# Patient Record
Sex: FEMALE | Race: WHITE | URBAN - METROPOLITAN AREA
[De-identification: names, ages, dates, MRNs, and addresses within clinical notes are randomized per-mention and may not be internally consistent; named-entity substitution may affect disease eponyms.]

---

## 2020-06-29 ENCOUNTER — LAB OUTSIDE AN ENCOUNTER (OUTPATIENT)
Dept: URBAN - METROPOLITAN AREA CLINIC 96 | Facility: CLINIC | Age: 65
End: 2020-06-29

## 2020-07-02 ENCOUNTER — OFFICE VISIT (OUTPATIENT)
Dept: URBAN - METROPOLITAN AREA CLINIC 53 | Facility: CLINIC | Age: 65
End: 2020-07-02

## 2020-07-02 ENCOUNTER — OFFICE VISIT (OUTPATIENT)
Dept: URBAN - METROPOLITAN AREA CLINIC 53 | Facility: CLINIC | Age: 65
End: 2020-07-02
Payer: MEDICARE

## 2020-07-02 DIAGNOSIS — K51.80 CHRONIC PANCOLONIC ULCERATIVE COLITIS: ICD-10-CM

## 2020-07-02 LAB
PERFORMING LAB: (no result)
SOURCE: (no result)

## 2020-07-02 PROCEDURE — 96413 CHEMO IV INFUSION 1 HR: CPT | Performed by: INTERNAL MEDICINE

## 2020-07-07 ENCOUNTER — OFFICE VISIT (OUTPATIENT)
Dept: URBAN - METROPOLITAN AREA CLINIC 96 | Facility: CLINIC | Age: 65
End: 2020-07-07

## 2020-07-08 ENCOUNTER — LAB OUTSIDE AN ENCOUNTER (OUTPATIENT)
Dept: URBAN - METROPOLITAN AREA TELEHEALTH 2 | Facility: TELEHEALTH | Age: 65
End: 2020-07-08

## 2020-07-08 ENCOUNTER — OFFICE VISIT (OUTPATIENT)
Dept: URBAN - METROPOLITAN AREA TELEHEALTH 2 | Facility: TELEHEALTH | Age: 65
End: 2020-07-08
Payer: MEDICARE

## 2020-07-08 DIAGNOSIS — K51.00 ULCERATIVE COLITIS, UNIVERSAL, WITHOUT COMPLICATIONS: ICD-10-CM

## 2020-07-08 DIAGNOSIS — R19.7 DIARRHEA: ICD-10-CM

## 2020-07-08 DIAGNOSIS — Z79.899 LONG-TERM USE OF IMMUNOSUPPRESSANT MEDICATION: ICD-10-CM

## 2020-07-08 DIAGNOSIS — Z09 FOLLOW UP: ICD-10-CM

## 2020-07-08 DIAGNOSIS — R15.2 FECAL URGENCY: ICD-10-CM

## 2020-07-08 PROCEDURE — 3017F COLORECTAL CA SCREEN DOC REV: CPT | Performed by: INTERNAL MEDICINE

## 2020-07-08 PROCEDURE — G8420 CALC BMI NORM PARAMETERS: HCPCS | Performed by: INTERNAL MEDICINE

## 2020-07-08 PROCEDURE — 1036F TOBACCO NON-USER: CPT | Performed by: INTERNAL MEDICINE

## 2020-07-08 PROCEDURE — G8482 FLU IMMUNIZE ORDER/ADMIN: HCPCS | Performed by: INTERNAL MEDICINE

## 2020-07-08 PROCEDURE — 99215 OFFICE O/P EST HI 40 MIN: CPT | Performed by: INTERNAL MEDICINE

## 2020-07-08 PROCEDURE — 99214 OFFICE O/P EST MOD 30 MIN: CPT | Performed by: INTERNAL MEDICINE

## 2020-07-08 PROCEDURE — G9903 PT SCRN TBCO ID AS NON USER: HCPCS | Performed by: INTERNAL MEDICINE

## 2020-07-08 PROCEDURE — G9622 NO UNHEAL ETOH USER: HCPCS | Performed by: INTERNAL MEDICINE

## 2020-07-08 PROCEDURE — G8427 DOCREV CUR MEDS BY ELIG CLIN: HCPCS | Performed by: INTERNAL MEDICINE

## 2020-07-08 NOTE — HPI-OTHER HISTORIES
Pt Eloy is a 63 y/o with h/o pan-UC, currently on Remicade (every 6 weeks 7.5mg/kg) here for refractory dz. . Pt is referred by Dr. Morton. . She was diagnosed with UC around 34 years of age.  She had severe inflammation and diarrhea/bleeding.  Was given steroids.  She started Remicade in 2011, which has worked very well for her. She has used suppositories (had reactive diarrhea with Canasa)  and oral agents. . Previously on 11/11/2019, pt reports that she has up 1-2 bm per day, no blood in the  stool, no abdominal pain.  No nausea or vomiting. Stress does exacerbate her sxs.  We started on Colazol . Previously on 12/10/2019, pt reports that she started to experience worsening diarrhea after starting the Colazol.  She got a sooner dose of Remicade. She stopped the Colazol and diarrhea improved.  However, she seems to have breakthrough sxs at 5-6 weeks.  She is prednsione 20mg. . Today on 7/8/2020, pt reports that she had pneumonia (first time) on December 26, 2019.  She has 4-5 BM per day.  No blood in the stool.  She sees breakthrough of sxs about 2 week prior to her next infusion.  . No joint pain or rashes. . Labs: 7/2020: IFX trough level 7.2 and AB neg 11/2019: IFX 13 (AB 36) every 7 week at 5mg/kg  2 week trough level, quant-gold indeterminate, CRP 7, ESR 11 11/2018: Hgb 13.1, ast 21, alt 19; hep B neg; B12 558 . SH: no smoke/etoh; some MJ use FH: no IBD  . 1/2019: A. Cecum , Biopsy: -Focal active colitis (see comment). -Negative for dysplasia or malignancy. B. Colon 70cm , Biopsy: -Colonic mucosa with no diagnostic abnormality.  -No evidence of chronic or active colitis.  -No granulomas seen.  -Negative for dysplasia or malignancy. C. Ascending Colon Polyp, Polypectomy: -Sessile serrated polyp. D. Colon 60cm , Biopsy: -Colonic mucosa with no diagnostic abnormality.  -No evidence of chronic or active colitis.  -No granulomas seen.  -Negative for dysplasia or malignancy. E. Colon 50cm , Biopsy: -Colonic mucosa with no diagnostic abnormality.  -No evidence of chronic or active colitis.  -No granulomas seen.  -Negative for dysplasia or malignancy. F. Colon 40cm , Biopsy: -Colonic mucosa with no diagnostic abnormality.  -No evidence of chronic or active colitis.  -No granulomas seen.  -Negative for dysplasia or malignancy. G. Colon 30cm , Biopsy: -Colonic mucosa with no diagnostic abnormality.  -No evidence of chronic or active colitis.  -No granulomas seen.  -Negative for dysplasia or malignancy. H. Colon 20cm , Biopsy: -Chronic active colitis, minimal, consistent with history of Ulcerative Colitis. -No granulomas, negative for dysplasia or malignancy. I . Rectum , Biopsy: -Chronic active colitis, mild, consistent with history of Ulcerative Colitis. -No granulomas, negative for dysplasia or malignancy. . 2017: A. CECUM (BIOPSY):     COLONIC MUCOSA WITH NO SIGNIFICANT HISTOPATHOLOGY.     NO HISTOLOGIC EVIDENCE OF ACTIVE, CHRONIC OR MICROSCOPIC COLITIS.  NO GRANULOMATA OR DYSPLASIA IDENTIFIED.    B. COLON, 70 CM (BIOPSY):     COLONIC MUCOSA WITH NO SIGNIFICANT HISTOPATHOLOGY.     NO HISTOLOGIC EVIDENCE OF ACTIVE, CHRONIC OR MICROSCOPIC COLITIS.  NO GRANULOMATA OR DYSPLASIA IDENTIFIED.      C. COLON, 60 CM (BIOPSY):      COLONIC MUCOSA WITH NO SIGNIFICANT HISTOPATHOLOGY.     NO HISTOLOGIC EVIDENCE OF ACTIVE, CHRONIC OR MICROSCOPIC COLITIS.  NO GRANULOMATA OR DYSPLASIA IDENTIFIED.      D. COLON, 50 CM (BIOPSY):     COLONIC MUCOSA WITH NO SIGNIFICANT HISTOPATHOLOGY.     NO HISTOLOGIC EVIDENCE OF ACTIVE, CHRONIC OR MICROSCOPIC COLITIS.  NO GRANULOMATA OR DYSPLASIA IDENTIFIED.      E. COLON, 40 CM (BIOPSY):     CHRONIC INACTIVE COLITIS.  NO GRANULOMATA OR DYSPLASIA IDENTIFIED.    F. COLON, 30 CM (BIOPSY):     CHRONIC INACTIVE COLITIS.  NO GRANULOMATA OR DYSPLASIA IDENTIFIED.      G. COLON, 20 CM   (BIOPSY):     CHRONIC INACTIVE COLITIS.  NO GRANULOMATA OR DYSPLASIA IDENTIFIED.      H. RECTUM, 10 CM (BIOPSY):     CHRONIC MILDLY ACTIVE COLITIS/PROCTITIS.  NO GRANULOMATA OR DYSPLASIA IDENTIFIED.  . 2016: A. CECUM-BIOPSY COLONIC MUCOSA WITH NO CRYPT DISTORTION, DISARRAY, OR SIGNIFICANT ACTIVE INFLAMMATION.   NEGATIVE FOR GRANULOMAS AND DYSPLASIA. B. COLON-BIOPSY, 70 CM COLONIC MUCOSA WITH NO CRYPT DISTORTION, DISARRAY, OR SIGNIFICANT ACTIVE INFLAMMATION.   NEGATIVE FOR GRANULOMAS AND DYSPLASIA. C. COLON-BIOPSY, 60 CM COLONIC MUCOSA WITH NO CRYPT DISTORTION, DISARRAY, OR SIGNIFICANT ACTIVE INFLAMMATION.   NEGATIVE FOR GRANULOMAS AND DYSPLASIA. D. COLON-BIOPSY, 50 CM COLONIC MUCOSA WITH NO CRYPT DISTORTION, DISARRAY, OR SIGNIFICANT ACTIVE INFLAMMATION.   NEGATIVE FOR GRANULOMAS AND DYSPLASIA. E. COLON-BIOPSY, 40 CM COLONIC MUCOSA WITH NO CRYPT DISTORTION, DISARRAY, OR SIGNIFICANT ACTIVE INFLAMMATION.   NEGATIVE FOR GRANULOMAS AND DYSPLASIA. F. COLON-BIOPSY, 30 CM COLONIC MUCOSA WITH NO CRYPT DISTORTION, DISARRAY, OR SIGNIFICANT ACTIVE INFLAMMATION.   NEGATIVE FOR GRANULOMAS AND DYSPLASIA. G. COLON-BIOPSY, 20 CM COLONIC MUCOSA WITH NO CRYPT DISTORTION, DISARRAY, OR SIGNIFICANT ACTIVE INFLAMMATION.   NEGATIVE FOR GRANULOMAS AND DYSPLASIA. H. RECTUM-BIOPSY CONSISTENT WITH ULCERATIVE COLITIS WITH MILD ACTIVE INFLAMMATION.  NEGATIVE FOR  GRANULOMAS AND DYSPLASIA. INCREASED LYMPHOPLASMACYTIC INFILTRATION, ACTIVE CRYPT INJURY, OR GRANULOMAS,  CONSISTENT WITH QUIESCENT INFLAMMATORY BOWEL DISEASE. H. COLON (BIOPSY), @ 10 CM ACTIVE CHRONIC COLITIS CONSISTENT MILDLY ACTIVE WITH INFLAMMATORY BOWEL DISEASE. NEGATIVE FOR GRANULOMAS AND DYSPLASIA. . 6/2015: A. CECUM-BIOPSY COLONIC MUCOSA WITH NO CRYPT DISTORTION, DISARRAY, OR SIGNIFICANT ACTIVE INFLAMMATION.   NEGATIVE FOR GRANULOMAS AND DYSPLASIA. B. COLON-BIOPSY, 70 CM COLONIC MUCOSA WITH NO CRYPT DISTORTION, DISARRAY, OR SIGNIFICANT ACTIVE INFLAMMATION.   NEGATIVE FOR GRANULOMAS AND DYSPLASIA. C. COLON-BIOPSY, 60 CM COLONIC MUCOSA WITH NO CRYPT DISTORTION, DISARRAY, OR SIGNIFICANT ACTIVE INFLAMMATION.   NEGATIVE FOR GRANULOMAS AND DYSPLASIA. D. COLON-BIOPSY, 50 CM COLONIC MUCOSA WITH NO CRYPT DISTORTION, DISARRAY, OR SIGNIFICANT ACTIVE INFLAMMATION.   NEGATIVE FOR GRANULOMAS AND DYSPLASIA. E. COLON-BIOPSY, 40 CM COLONIC MUCOSA WITH NO CRYPT DISTORTION, DISARRAY, OR SIGNIFICANT ACTIVE INFLAMMATION.   NEGATIVE FOR GRANULOMAS AND DYSPLASIA. F. COLON-BIOPSY, 30 CM COLONIC MUCOSA WITH NO CRYPT DISTORTION, DISARRAY, OR SIGNIFICANT ACTIVE INFLAMMATION.   NEGATIVE FOR GRANULOMAS AND DYSPLASIA. G. COLON-BIOPSY, 20 CM COLONIC MUCOSA WITH NO CRYPT DISTORTION, DISARRAY, OR SIGNIFICANT ACTIVE INFLAMMATION.   NEGATIVE FOR GRANULOMAS AND DYSPLASIA. H. RECTUM-BIOPSY CONSISTENT WITH ULCERATIVE COLITIS WITH MILD ACTIVE INFLAMMATION.  NEGATIVE FOR  GRANULOMAS AND DYSPLASIA.

## 2020-07-09 ENCOUNTER — OFFICE VISIT (OUTPATIENT)
Dept: URBAN - METROPOLITAN AREA CLINIC 53 | Facility: CLINIC | Age: 65
End: 2020-07-09

## 2020-07-15 ENCOUNTER — LAB OUTSIDE AN ENCOUNTER (OUTPATIENT)
Dept: URBAN - METROPOLITAN AREA TELEHEALTH 2 | Facility: TELEHEALTH | Age: 65
End: 2020-07-15

## 2020-08-13 ENCOUNTER — OFFICE VISIT (OUTPATIENT)
Dept: URBAN - METROPOLITAN AREA CLINIC 97 | Facility: CLINIC | Age: 65
End: 2020-08-13
Payer: MEDICARE

## 2020-08-13 VITALS
TEMPERATURE: 96.8 F | BODY MASS INDEX: 18.27 KG/M2 | DIASTOLIC BLOOD PRESSURE: 78 MMHG | HEIGHT: 64 IN | WEIGHT: 107 LBS | SYSTOLIC BLOOD PRESSURE: 102 MMHG | RESPIRATION RATE: 16 BRPM

## 2020-08-13 DIAGNOSIS — K51.80 CHRONIC PANCOLONIC ULCERATIVE COLITIS: ICD-10-CM

## 2020-08-13 PROCEDURE — 96413 CHEMO IV INFUSION 1 HR: CPT | Performed by: INTERNAL MEDICINE

## 2020-09-24 ENCOUNTER — OFFICE VISIT (OUTPATIENT)
Dept: URBAN - METROPOLITAN AREA CLINIC 97 | Facility: CLINIC | Age: 65
End: 2020-09-24
Payer: MEDICARE

## 2020-09-24 DIAGNOSIS — K51.90 COLITIS, ULCERATIVE: ICD-10-CM

## 2020-09-24 PROCEDURE — 96413 CHEMO IV INFUSION 1 HR: CPT | Performed by: INTERNAL MEDICINE

## 2020-10-28 ENCOUNTER — TELEPHONE ENCOUNTER (OUTPATIENT)
Dept: URBAN - METROPOLITAN AREA CLINIC 92 | Facility: CLINIC | Age: 65
End: 2020-10-28

## 2020-11-05 ENCOUNTER — CLAIMS CREATED FROM THE CLAIM WINDOW (OUTPATIENT)
Dept: URBAN - METROPOLITAN AREA CLINIC 97 | Facility: CLINIC | Age: 65
End: 2020-11-05
Payer: MEDICARE

## 2020-11-05 VITALS
RESPIRATION RATE: 16 BRPM | WEIGHT: 106 LBS | HEIGHT: 64 IN | BODY MASS INDEX: 18.1 KG/M2 | SYSTOLIC BLOOD PRESSURE: 111 MMHG | DIASTOLIC BLOOD PRESSURE: 70 MMHG | TEMPERATURE: 98.1 F

## 2020-11-05 DIAGNOSIS — K51.80 CHRONIC PANCOLONIC ULCERATIVE COLITIS: ICD-10-CM

## 2020-11-05 PROCEDURE — 96413 CHEMO IV INFUSION 1 HR: CPT | Performed by: INTERNAL MEDICINE

## 2020-11-07 LAB
A/G RATIO: 1.3
ALBUMIN: 4.4
ALKALINE PHOSPHATASE: 60
ALT (SGPT): 16
ANTI-INFLIXIMAB ANTIBODY: <22
AST (SGOT): 25
BASO (ABSOLUTE): 0
BASOS: 1
BILIRUBIN, TOTAL: 1.4
BUN/CREATININE RATIO: 14
BUN: 10
CALCIUM: 9.5
CARBON DIOXIDE, TOTAL: 24
CHLORIDE: 100
CREATININE: 0.73
EGFR IF AFRICN AM: 99
EGFR IF NONAFRICN AM: 85
EOS (ABSOLUTE): 0.2
EOS: 6
GLOBULIN, TOTAL: 3.3
GLUCOSE: 93
HEMATOCRIT: 37
HEMATOLOGY COMMENTS:: (no result)
HEMOGLOBIN: 12.2
IMMATURE CELLS: (no result)
IMMATURE GRANS (ABS): 0
IMMATURE GRANULOCYTES: 0
INFLIXIMAB DRUG LEVEL: 11
LYMPHS (ABSOLUTE): 1.9
LYMPHS: 43
MCH: 30.2
MCHC: 33
MCV: 92
MONOCYTES(ABSOLUTE): 0.4
MONOCYTES: 9
NEUTROPHILS (ABSOLUTE): 1.8
NEUTROPHILS: 41
NRBC: (no result)
PLATELETS: 235
POTASSIUM: 4.1
PROTEIN, TOTAL: 7.7
QUANTIFERON INCUBATION: (no result)
QUANTIFERON-TB GOLD PLUS: (no result)
RBC: 4.04
RDW: 12.8
REQUEST PROBLEM: (no result)
SODIUM: 138
WBC: 4.4

## 2020-11-11 ENCOUNTER — TELEPHONE ENCOUNTER (OUTPATIENT)
Dept: URBAN - METROPOLITAN AREA CLINIC 92 | Facility: CLINIC | Age: 65
End: 2020-11-11

## 2020-12-17 ENCOUNTER — TELEPHONE ENCOUNTER (OUTPATIENT)
Dept: URBAN - METROPOLITAN AREA CLINIC 18 | Facility: CLINIC | Age: 65
End: 2020-12-17

## 2020-12-17 ENCOUNTER — OFFICE VISIT (OUTPATIENT)
Dept: URBAN - METROPOLITAN AREA CLINIC 97 | Facility: CLINIC | Age: 65
End: 2020-12-17
Payer: MEDICARE

## 2020-12-17 DIAGNOSIS — K51.80 CHRONIC PANCOLONIC ULCERATIVE COLITIS: ICD-10-CM

## 2020-12-17 PROCEDURE — 96413 CHEMO IV INFUSION 1 HR: CPT | Performed by: INTERNAL MEDICINE

## 2021-01-08 ENCOUNTER — TELEPHONE ENCOUNTER (OUTPATIENT)
Dept: URBAN - METROPOLITAN AREA CLINIC 92 | Facility: CLINIC | Age: 66
End: 2021-01-08

## 2021-01-28 ENCOUNTER — OFFICE VISIT (OUTPATIENT)
Dept: URBAN - METROPOLITAN AREA CLINIC 97 | Facility: CLINIC | Age: 66
End: 2021-01-28
Payer: MEDICARE

## 2021-01-28 VITALS
HEIGHT: 64 IN | BODY MASS INDEX: 19.29 KG/M2 | RESPIRATION RATE: 16 BRPM | TEMPERATURE: 96.9 F | WEIGHT: 113 LBS | SYSTOLIC BLOOD PRESSURE: 128 MMHG | DIASTOLIC BLOOD PRESSURE: 72 MMHG | HEART RATE: 56 BPM

## 2021-01-28 DIAGNOSIS — K50.80 CROHN'S COLITIS: ICD-10-CM

## 2021-01-28 PROCEDURE — 96413 CHEMO IV INFUSION 1 HR: CPT | Performed by: INTERNAL MEDICINE

## 2021-03-08 ENCOUNTER — TELEPHONE ENCOUNTER (OUTPATIENT)
Dept: URBAN - METROPOLITAN AREA CLINIC 18 | Facility: CLINIC | Age: 66
End: 2021-03-08

## 2021-03-08 ENCOUNTER — OFFICE VISIT (OUTPATIENT)
Dept: URBAN - METROPOLITAN AREA CLINIC 97 | Facility: CLINIC | Age: 66
End: 2021-03-08
Payer: MEDICARE

## 2021-03-08 DIAGNOSIS — K51.80 CHRONIC PANCOLONIC ULCERATIVE COLITIS: ICD-10-CM

## 2021-03-08 PROCEDURE — 96413 CHEMO IV INFUSION 1 HR: CPT | Performed by: INTERNAL MEDICINE

## 2021-04-21 ENCOUNTER — OFFICE VISIT (OUTPATIENT)
Dept: URBAN - METROPOLITAN AREA CLINIC 97 | Facility: CLINIC | Age: 66
End: 2021-04-21
Payer: MEDICARE

## 2021-04-21 DIAGNOSIS — K51.80 CHRONIC PANCOLONIC ULCERATIVE COLITIS: ICD-10-CM

## 2021-04-21 PROCEDURE — 96413 CHEMO IV INFUSION 1 HR: CPT | Performed by: INTERNAL MEDICINE

## 2021-05-05 ENCOUNTER — TELEPHONE ENCOUNTER (OUTPATIENT)
Dept: URBAN - METROPOLITAN AREA CLINIC 98 | Facility: CLINIC | Age: 66
End: 2021-05-05

## 2021-05-05 ENCOUNTER — LAB OUTSIDE AN ENCOUNTER (OUTPATIENT)
Dept: URBAN - METROPOLITAN AREA CLINIC 98 | Facility: CLINIC | Age: 66
End: 2021-05-05

## 2021-05-05 ENCOUNTER — OFFICE VISIT (OUTPATIENT)
Dept: URBAN - METROPOLITAN AREA CLINIC 98 | Facility: CLINIC | Age: 66
End: 2021-05-05
Payer: MEDICARE

## 2021-05-05 DIAGNOSIS — Z91.89 COLON CANCER HIGH RISK: ICD-10-CM

## 2021-05-05 DIAGNOSIS — D51.1 VIT B12 DEFIC ANEMIA D/T SLCTV VIT B12 MALABSORP W PROTEIN: ICD-10-CM

## 2021-05-05 DIAGNOSIS — E55.9 VITAMIN D DEFICIENCY: ICD-10-CM

## 2021-05-05 DIAGNOSIS — K51.00 ULCERATIVE COLITIS, UNIVERSAL, WITHOUT COMPLICATIONS: ICD-10-CM

## 2021-05-05 PROCEDURE — 99215 OFFICE O/P EST HI 40 MIN: CPT | Performed by: INTERNAL MEDICINE

## 2021-05-05 RX ORDER — SODIUM SULFATE, MAGNESIUM SULFATE, AND POTASSIUM CHLORIDE 17.75; 2.7; 2.25 G/1; G/1; G/1
12 TABLETS TABLET ORAL
Qty: 24 TABLET | Refills: 0 | OUTPATIENT
Start: 2021-05-05 | End: 2021-05-06

## 2021-05-05 NOTE — HPI-TODAY'S VISIT:
Pt Eloy is a 66 y/o with h/o pan-UC, currently on Remicade (every 6 weeks 7.5mg/kg) here for refractory dz. . Pt is referred by Dr. Morton. . She was diagnosed with UC around 34 years of age.  She had severe inflammation and diarrhea/bleeding.  Was given steroids.  She started Remicade in 2011, which has worked very well for her. She has used suppositories (had reactive diarrhea with Canasa)  and oral agents. . Previously on 11/11/2019, pt reports that she has up 1-2 bm per day, no blood in the  stool, no abdominal pain.  No nausea or vomiting. Stress does exacerbate her sxs.  We started on Colazol . Previously on 12/10/2019, pt reports that she started to experience worsening diarrhea after starting the Colazol.  She got a sooner dose of Remicade. She stopped the Colazol and diarrhea improved.  However, she seems to have breakthrough sxs at 5-6 weeks.  She is prednsione 20mg. . Previously on 7/8/2020, pt reports that she had pneumonia (first time) on December 26, 2019.  She has 4-5 BM per day.  No blood in the stool.  She sees breakthrough of sxs about 2 week prior to her next infusion. . Today on 5/5/2021, pt reports that she continues to have good and bad days with her UC.  She seems to have breakthrough sxs  . No joint pain or rashes. . Labs: 7/2020: IFX trough level 7.2 and AB neg 11/2019: IFX 13 (AB 36) every 7 week at 5mg/kg  2 week trough level, quant-gold indeterminate, CRP 7, ESR 11 11/2018: Hgb 13.1, ast 21, alt 19; hep B neg; B12 558 . SH: no smoke/etoh; some MJ use FH: no IBD  . 1/2019: A. Cecum , Biopsy: -Focal active colitis (see comment). -Negative for dysplasia or malignancy. B. Colon 70cm , Biopsy: -Colonic mucosa with no diagnostic abnormality.  -No evidence of chronic or active colitis.  -No granulomas seen.  -Negative for dysplasia or malignancy. C. Ascending Colon Polyp, Polypectomy: -Sessile serrated polyp. D. Colon 60cm , Biopsy: -Colonic mucosa with no diagnostic abnormality.  -No evidence of chronic or active colitis.  -No granulomas seen.  -Negative for dysplasia or malignancy. E. Colon 50cm , Biopsy: -Colonic mucosa with no diagnostic abnormality.  -No evidence of chronic or active colitis.  -No granulomas seen.  -Negative for dysplasia or malignancy. F. Colon 40cm , Biopsy: -Colonic mucosa with no diagnostic abnormality.  -No evidence of chronic or active colitis.  -No granulomas seen.  -Negative for dysplasia or malignancy. G. Colon 30cm , Biopsy: -Colonic mucosa with no diagnostic abnormality.  -No evidence of chronic or active colitis.  -No granulomas seen.  -Negative for dysplasia or malignancy. H. Colon 20cm , Biopsy: -Chronic active colitis, minimal, consistent with history of Ulcerative Colitis. -No granulomas, negative for dysplasia or malignancy. I . Rectum , Biopsy: -Chronic active colitis, mild, consistent with history of Ulcerative Colitis. -No granulomas, negative for dysplasia or malignancy. . 2017: A. CECUM (BIOPSY):     COLONIC MUCOSA WITH NO SIGNIFICANT HISTOPATHOLOGY.     NO HISTOLOGIC EVIDENCE OF ACTIVE, CHRONIC OR MICROSCOPIC COLITIS.  NO GRANULOMATA OR DYSPLASIA IDENTIFIED.    B. COLON, 70 CM (BIOPSY):     COLONIC MUCOSA WITH NO SIGNIFICANT HISTOPATHOLOGY.     NO HISTOLOGIC EVIDENCE OF ACTIVE, CHRONIC OR MICROSCOPIC COLITIS.  NO GRANULOMATA OR DYSPLASIA IDENTIFIED.      C. COLON, 60 CM (BIOPSY):      COLONIC MUCOSA WITH NO SIGNIFICANT HISTOPATHOLOGY.     NO HISTOLOGIC EVIDENCE OF ACTIVE, CHRONIC OR MICROSCOPIC COLITIS.  NO GRANULOMATA OR DYSPLASIA IDENTIFIED.      D. COLON, 50 CM (BIOPSY):     COLONIC MUCOSA WITH NO SIGNIFICANT HISTOPATHOLOGY.     NO HISTOLOGIC EVIDENCE OF ACTIVE, CHRONIC OR MICROSCOPIC COLITIS.  NO GRANULOMATA OR DYSPLASIA IDENTIFIED.      E. COLON, 40 CM (BIOPSY):     CHRONIC INACTIVE COLITIS.  NO GRANULOMATA OR DYSPLASIA IDENTIFIED.    F. COLON, 30 CM (BIOPSY):     CHRONIC INACTIVE COLITIS.  NO GRANULOMATA OR DYSPLASIA IDENTIFIED.      G. COLON, 20 CM   (BIOPSY):     CHRONIC INACTIVE COLITIS.  NO GRANULOMATA OR DYSPLASIA IDENTIFIED.      H. RECTUM, 10 CM (BIOPSY):     CHRONIC MILDLY ACTIVE COLITIS/PROCTITIS.  NO GRANULOMATA OR DYSPLASIA IDENTIFIED.  . 2016: A. CECUM-BIOPSY COLONIC MUCOSA WITH NO CRYPT DISTORTION, DISARRAY, OR SIGNIFICANT ACTIVE INFLAMMATION.   NEGATIVE FOR GRANULOMAS AND DYSPLASIA. B. COLON-BIOPSY, 70 CM COLONIC MUCOSA WITH NO CRYPT DISTORTION, DISARRAY, OR SIGNIFICANT ACTIVE INFLAMMATION.   NEGATIVE FOR GRANULOMAS AND DYSPLASIA. C. COLON-BIOPSY, 60 CM COLONIC MUCOSA WITH NO CRYPT DISTORTION, DISARRAY, OR SIGNIFICANT ACTIVE INFLAMMATION.   NEGATIVE FOR GRANULOMAS AND DYSPLASIA. D. COLON-BIOPSY, 50 CM COLONIC MUCOSA WITH NO CRYPT DISTORTION, DISARRAY, OR SIGNIFICANT ACTIVE INFLAMMATION.   NEGATIVE FOR GRANULOMAS AND DYSPLASIA. E. COLON-BIOPSY, 40 CM COLONIC MUCOSA WITH NO CRYPT DISTORTION, DISARRAY, OR SIGNIFICANT ACTIVE INFLAMMATION.   NEGATIVE FOR GRANULOMAS AND DYSPLASIA. F. COLON-BIOPSY, 30 CM COLONIC MUCOSA WITH NO CRYPT DISTORTION, DISARRAY, OR SIGNIFICANT ACTIVE INFLAMMATION.   NEGATIVE FOR GRANULOMAS AND DYSPLASIA. G. COLON-BIOPSY, 20 CM COLONIC MUCOSA WITH NO CRYPT DISTORTION, DISARRAY, OR SIGNIFICANT ACTIVE INFLAMMATION.   NEGATIVE FOR GRANULOMAS AND DYSPLASIA. H. RECTUM-BIOPSY CONSISTENT WITH ULCERATIVE COLITIS WITH MILD ACTIVE INFLAMMATION.  NEGATIVE FOR  GRANULOMAS AND DYSPLASIA. INCREASED LYMPHOPLASMACYTIC INFILTRATION, ACTIVE CRYPT INJURY, OR GRANULOMAS,  CONSISTENT WITH QUIESCENT INFLAMMATORY BOWEL DISEASE. H. COLON (BIOPSY), @ 10 CM ACTIVE CHRONIC COLITIS CONSISTENT MILDLY ACTIVE WITH INFLAMMATORY BOWEL DISEASE. NEGATIVE FOR GRANULOMAS AND DYSPLASIA. . 6/2015: A. CECUM-BIOPSY COLONIC MUCOSA WITH NO CRYPT DISTORTION, DISARRAY, OR SIGNIFICANT ACTIVE INFLAMMATION.   NEGATIVE FOR GRANULOMAS AND DYSPLASIA. B. COLON-BIOPSY, 70 CM COLONIC MUCOSA WITH NO CRYPT DISTORTION, DISARRAY, OR SIGNIFICANT ACTIVE INFLAMMATION.   NEGATIVE FOR GRANULOMAS AND DYSPLASIA. C. COLON-BIOPSY, 60 CM COLONIC MUCOSA WITH NO CRYPT DISTORTION, DISARRAY, OR SIGNIFICANT ACTIVE INFLAMMATION.   NEGATIVE FOR GRANULOMAS AND DYSPLASIA. D. COLON-BIOPSY, 50 CM COLONIC MUCOSA WITH NO CRYPT DISTORTION, DISARRAY, OR SIGNIFICANT ACTIVE INFLAMMATION.   NEGATIVE FOR GRANULOMAS AND DYSPLASIA. E. COLON-BIOPSY, 40 CM COLONIC MUCOSA WITH NO CRYPT DISTORTION, DISARRAY, OR SIGNIFICANT ACTIVE INFLAMMATION.   NEGATIVE FOR GRANULOMAS AND DYSPLASIA. F. COLON-BIOPSY, 30 CM COLONIC MUCOSA WITH NO CRYPT DISTORTION, DISARRAY, OR SIGNIFICANT ACTIVE INFLAMMATION.   NEGATIVE FOR GRANULOMAS AND DYSPLASIA. G. COLON-BIOPSY, 20 CM COLONIC MUCOSA WITH NO CRYPT DISTORTION, DISARRAY, OR SIGNIFICANT ACTIVE INFLAMMATION.   NEGATIVE FOR GRANULOMAS AND DYSPLASIA. H. RECTUM-BIOPSY CONSISTENT WITH ULCERATIVE COLITIS WITH MILD ACTIVE INFLAMMATION.  NEGATIVE FOR  GRANULOMAS AND DYSPLASIA. .

## 2021-05-06 PROBLEM — 444546002 CHRONIC ULCERATIVE COLITIS: Status: ACTIVE | Noted: 2021-05-06

## 2021-05-12 ENCOUNTER — LAB OUTSIDE AN ENCOUNTER (OUTPATIENT)
Dept: URBAN - METROPOLITAN AREA CLINIC 98 | Facility: CLINIC | Age: 66
End: 2021-05-12

## 2021-06-03 ENCOUNTER — OFFICE VISIT (OUTPATIENT)
Dept: URBAN - METROPOLITAN AREA CLINIC 97 | Facility: CLINIC | Age: 66
End: 2021-06-03
Payer: MEDICARE

## 2021-06-03 VITALS
DIASTOLIC BLOOD PRESSURE: 74 MMHG | TEMPERATURE: 96.6 F | SYSTOLIC BLOOD PRESSURE: 112 MMHG | RESPIRATION RATE: 18 BRPM | BODY MASS INDEX: 17.75 KG/M2 | WEIGHT: 104 LBS | HEART RATE: 57 BPM | HEIGHT: 64 IN

## 2021-06-03 DIAGNOSIS — K51.80 CHRONIC PANCOLONIC ULCERATIVE COLITIS: ICD-10-CM

## 2021-06-03 PROCEDURE — 96413 CHEMO IV INFUSION 1 HR: CPT | Performed by: INTERNAL MEDICINE

## 2021-06-10 LAB
A/G RATIO: 1.4
ALBUMIN: 4.6
ALKALINE PHOSPHATASE: 57
ALT (SGPT): 18
ANTI-INFLIXIMAB ANTIBODY: <22
AST (SGOT): 21
BASO (ABSOLUTE): 0.1
BASOS: 1
BILIRUBIN, TOTAL: 1.1
BUN/CREATININE RATIO: 19
BUN: 14
CALCIUM: 9.8
CARBON DIOXIDE, TOTAL: 27
CHLORIDE: 98
CREATININE: 0.72
EGFR IF AFRICN AM: 100
EGFR IF NONAFRICN AM: 87
EOS (ABSOLUTE): 0.2
EOS: 4
FERRITIN, SERUM: 88
GLOBULIN, TOTAL: 3.4
GLUCOSE: 138
HEMATOCRIT: 38.7
HEMATOLOGY COMMENTS:: (no result)
HEMOGLOBIN: 12.8
IMMATURE CELLS: (no result)
IMMATURE GRANS (ABS): 0
IMMATURE GRANULOCYTES: 0
INFLIXIMAB DRUG LEVEL: 7.7
IRON BIND.CAP.(TIBC): 300
IRON SATURATION: 38
IRON: 114
LYMPHS (ABSOLUTE): 2.3
LYMPHS: 43
MCH: 30.9
MCHC: 33.1
MCV: 94
MONOCYTES(ABSOLUTE): 0.5
MONOCYTES: 9
NEUTROPHILS (ABSOLUTE): 2.2
NEUTROPHILS: 43
NRBC: (no result)
PLATELETS: 220
POTASSIUM: 4.2
PROTEIN, TOTAL: 8
RBC: 4.14
RDW: 12.9
SODIUM: 137
UIBC: 186
VITAMIN B12: 465
VITAMIN D, 25-HYDROXY: 46.7
WBC: 5.3

## 2021-07-13 ENCOUNTER — OFFICE VISIT (OUTPATIENT)
Dept: URBAN - METROPOLITAN AREA CLINIC 97 | Facility: CLINIC | Age: 66
End: 2021-07-13
Payer: MEDICARE

## 2021-07-13 VITALS
WEIGHT: 108 LBS | TEMPERATURE: 98.4 F | RESPIRATION RATE: 17 BRPM | DIASTOLIC BLOOD PRESSURE: 77 MMHG | SYSTOLIC BLOOD PRESSURE: 107 MMHG | HEART RATE: 55 BPM | BODY MASS INDEX: 18.44 KG/M2 | HEIGHT: 64 IN

## 2021-07-13 DIAGNOSIS — K51.80 CHRONIC PANCOLONIC ULCERATIVE COLITIS: ICD-10-CM

## 2021-07-13 PROCEDURE — 96413 CHEMO IV INFUSION 1 HR: CPT | Performed by: INTERNAL MEDICINE

## 2021-08-19 ENCOUNTER — OFFICE VISIT (OUTPATIENT)
Dept: URBAN - METROPOLITAN AREA SURGERY CENTER 18 | Facility: SURGERY CENTER | Age: 66
End: 2021-08-19
Payer: MEDICARE

## 2021-08-19 ENCOUNTER — CLAIMS CREATED FROM THE CLAIM WINDOW (OUTPATIENT)
Dept: URBAN - METROPOLITAN AREA CLINIC 4 | Facility: CLINIC | Age: 66
End: 2021-08-19
Payer: MEDICARE

## 2021-08-19 DIAGNOSIS — K63.89 COLONIC PSEUDOMELANOSIS: ICD-10-CM

## 2021-08-19 DIAGNOSIS — K51.911 ULCERATIVE COLITIS, UNSPECIFIED WITH RECTAL BLEEDING: ICD-10-CM

## 2021-08-19 DIAGNOSIS — K51.00 ACUTE ULCERATIVE PANCOLITIS: ICD-10-CM

## 2021-08-19 PROCEDURE — G8907 PT DOC NO EVENTS ON DISCHARG: HCPCS | Performed by: INTERNAL MEDICINE

## 2021-08-19 PROCEDURE — 88305 TISSUE EXAM BY PATHOLOGIST: CPT | Performed by: PATHOLOGY

## 2021-08-19 PROCEDURE — 45380 COLONOSCOPY AND BIOPSY: CPT | Performed by: INTERNAL MEDICINE

## 2021-08-23 ENCOUNTER — OFFICE VISIT (OUTPATIENT)
Dept: URBAN - METROPOLITAN AREA CLINIC 97 | Facility: CLINIC | Age: 66
End: 2021-08-23
Payer: MEDICARE

## 2021-08-23 VITALS
TEMPERATURE: 98.4 F | DIASTOLIC BLOOD PRESSURE: 69 MMHG | SYSTOLIC BLOOD PRESSURE: 108 MMHG | HEIGHT: 64 IN | RESPIRATION RATE: 17 BRPM | HEART RATE: 51 BPM | WEIGHT: 105 LBS | BODY MASS INDEX: 17.93 KG/M2

## 2021-08-23 DIAGNOSIS — K51.80 CHRONIC PANCOLONIC ULCERATIVE COLITIS: ICD-10-CM

## 2021-08-23 PROCEDURE — 96413 CHEMO IV INFUSION 1 HR: CPT | Performed by: INTERNAL MEDICINE

## 2021-09-08 ENCOUNTER — OFFICE VISIT (OUTPATIENT)
Dept: URBAN - METROPOLITAN AREA CLINIC 98 | Facility: CLINIC | Age: 66
End: 2021-09-08
Payer: MEDICARE

## 2021-09-08 DIAGNOSIS — R19.7 DIARRHEA: ICD-10-CM

## 2021-09-08 DIAGNOSIS — E55.9 VITAMIN D DEFICIENCY: ICD-10-CM

## 2021-09-08 DIAGNOSIS — Z79.899 LONG-TERM USE OF IMMUNOSUPPRESSANT MEDICATION: ICD-10-CM

## 2021-09-08 DIAGNOSIS — K51.00 ULCERATIVE COLITIS, UNIVERSAL, WITHOUT COMPLICATIONS: ICD-10-CM

## 2021-09-08 DIAGNOSIS — Z09 FOLLOW UP: ICD-10-CM

## 2021-09-08 DIAGNOSIS — R15.2 FECAL URGENCY: ICD-10-CM

## 2021-09-08 DIAGNOSIS — D51.1 VIT B12 DEFIC ANEMIA D/T SLCTV VIT B12 MALABSORP W PROTEIN: ICD-10-CM

## 2021-09-08 PROCEDURE — 99214 OFFICE O/P EST MOD 30 MIN: CPT | Performed by: INTERNAL MEDICINE

## 2021-09-08 RX ORDER — INFLIXIMAB 100 MG/10ML
AS DIRECTED INJECTION, POWDER, LYOPHILIZED, FOR SOLUTION INTRAVENOUS
Qty: 100 MILLIGRAMS | Refills: 0 | OUTPATIENT
Start: 2022-06-20 | End: 2022-07-20

## 2021-09-08 RX ORDER — BUDESONIDE 28 MG/1
1 APPLICATION AEROSOL, FOAM RECTAL
Qty: 56 | Refills: 1 | OUTPATIENT
Start: 2021-09-08 | End: 2021-11-30

## 2021-09-08 NOTE — HPI-TODAY'S VISIT:
Pt Eloy is a 66 y/o with h/o pan-UC, currently on Remicade (every 6 weeks 7.5mg/kg) here for refractory dz. . Pt is referred by Dr. Morton. . She was diagnosed with UC around 34 years of age.  She had severe inflammation and diarrhea/bleeding.  Was given steroids.  She started Remicade in 2011, which has worked very well for her. She has used suppositories (had reactive diarrhea with Canasa)  and oral agents. . Previously on 11/11/2019, pt reports that she has up 1-2 bm per day, no blood in the  stool, no abdominal pain.  No nausea or vomiting. Stress does exacerbate her sxs.  We started on Colazol . Previously on 12/10/2019, pt reports that she started to experience worsening diarrhea after starting the Colazol.  She got a sooner dose of Remicade. She stopped the Colazol and diarrhea improved.  However, she seems to have breakthrough sxs at 5-6 weeks.  She is prednsione 20mg. . Previously on 7/8/2020, pt reports that she had pneumonia (first time) on December 26, 2019.  She has 4-5 BM per day.  No blood in the stool.  She sees breakthrough of sxs about 2 week prior to her next infusion. . Previously on 5/5/2021, pt reports that she continues to have good and bad days with her UC.  She seems to have breakthrough sxs . Today on 9/8/2021, pt reports that she has no blood and diarrhea; she reports that  . No joint pain or rashes. . Labs: 7/2020: IFX trough level 7.2 and AB neg 11/2019: IFX 13 (AB 36) every 7 week at 5mg/kg  2 week trough level, quant-gold indeterminate, CRP 7, ESR 11 11/2018: Hgb 13.1, ast 21, alt 19; hep B neg; B12 558 . SH: no smoke/etoh; some MJ use FH: no IBD . Colonoscopy: 8/2021: The ascending colon appeared normal. Biopsies were taken with a cold forceps for histology. The pathology specimen was placed into Bottle Number 1. Findings: The transverse colon appeared normal. Biopsies were taken with a cold forceps for histology. The pathology specimen was placed into Bottle Number 2. The descending colon appeared normal. Biopsies were taken with a cold forceps for histology. The pathology specimen was placed into Bottle Number 3. A diffuse area of mildly erythematous mucosa was found in the sigmoid colon. This was biopsied with a cold forceps for histology. The pathology specimen was placed into Bottle Number 4. A continuous area of nonbleeding ulcerated mucosa with no stigmata of recent bleeding was present in the rectum and in the recto-sigmoid colon. Biopsies were taken with a cold forceps for histology. The pathology specimen was placed into Bottle Number 5 . (A) Colon, Ascending, Biopsy: NO SIGNIFICANT ABNORMALITY. (B) Colon, Transverse, Biopsy: NO SIGNIFICANT ABNORMALITY. (C) Colon, Descending, Biopsy: NO SIGNIFICANT ABNORMALITY. (D) Colon, Sigmoid, Biopsy: NO SIGNIFICANT ABNORMALITY. (E) Rectum, Biopsy: DIFFUSE CHRONIC ACTIVE PROCTITIS, CONSISTENT WITH ULCERATIVE COLITIS. See Comment. Negative for Infectious Organisms, Dysplasia or Malignancy. COMMENT: Ulcerative colitis that is limited to the rectum has been referred to as ulcerative proctitis. This disease is thought to be less severe and to have a lower rate of dysplasia and adenocarcinoma than cases of ulcerative pan-colitis. MICROSCOPIC DESCRIPT . 1/2019: A. Cecum , Biopsy: -Focal active colitis (see comment). -Negative for dysplasia or malignancy. B. Colon 70cm , Biopsy: -Colonic mucosa with no diagnostic abnormality.  -No evidence of chronic or active colitis.  -No granulomas seen.  -Negative for dysplasia or malignancy. C. Ascending Colon Polyp, Polypectomy: -Sessile serrated polyp. D. Colon 60cm , Biopsy: -Colonic mucosa with no diagnostic abnormality.  -No evidence of chronic or active colitis.  -No granulomas seen.  -Negative for dysplasia or malignancy. E. Colon 50cm , Biopsy: -Colonic mucosa with no diagnostic abnormality.  -No evidence of chronic or active colitis.  -No granulomas seen.  -Negative for dysplasia or malignancy. F. Colon 40cm , Biopsy: -Colonic mucosa with no diagnostic abnormality.  -No evidence of chronic or active colitis.  -No granulomas seen.  -Negative for dysplasia or malignancy. G. Colon 30cm , Biopsy: -Colonic mucosa with no diagnostic abnormality.  -No evidence of chronic or active colitis.  -No granulomas seen.  -Negative for dysplasia or malignancy. H. Colon 20cm , Biopsy: -Chronic active colitis, minimal, consistent with history of Ulcerative Colitis. -No granulomas, negative for dysplasia or malignancy. I . Rectum , Biopsy: -Chronic active colitis, mild, consistent with history of Ulcerative Colitis. -No granulomas, negative for dysplasia or malignancy. . 2017: A. CECUM (BIOPSY):     COLONIC MUCOSA WITH NO SIGNIFICANT HISTOPATHOLOGY.     NO HISTOLOGIC EVIDENCE OF ACTIVE, CHRONIC OR MICROSCOPIC COLITIS.  NO GRANULOMATA OR DYSPLASIA IDENTIFIED.    B. COLON, 70 CM (BIOPSY):     COLONIC MUCOSA WITH NO SIGNIFICANT HISTOPATHOLOGY.     NO HISTOLOGIC EVIDENCE OF ACTIVE, CHRONIC OR MICROSCOPIC COLITIS.  NO GRANULOMATA OR DYSPLASIA IDENTIFIED.      C. COLON, 60 CM (BIOPSY):      COLONIC MUCOSA WITH NO SIGNIFICANT HISTOPATHOLOGY.     NO HISTOLOGIC EVIDENCE OF ACTIVE, CHRONIC OR MICROSCOPIC COLITIS.  NO GRANULOMATA OR DYSPLASIA IDENTIFIED.      D. COLON, 50 CM (BIOPSY):     COLONIC MUCOSA WITH NO SIGNIFICANT HISTOPATHOLOGY.     NO HISTOLOGIC EVIDENCE OF ACTIVE, CHRONIC OR MICROSCOPIC COLITIS.  NO GRANULOMATA OR DYSPLASIA IDENTIFIED.      E. COLON, 40 CM (BIOPSY):     CHRONIC INACTIVE COLITIS.  NO GRANULOMATA OR DYSPLASIA IDENTIFIED.    F. COLON, 30 CM (BIOPSY):     CHRONIC INACTIVE COLITIS.  NO GRANULOMATA OR DYSPLASIA IDENTIFIED.      G. COLON, 20 CM   (BIOPSY):     CHRONIC INACTIVE COLITIS.  NO GRANULOMATA OR DYSPLASIA IDENTIFIED.      H. RECTUM, 10 CM (BIOPSY):     CHRONIC MILDLY ACTIVE COLITIS/PROCTITIS.  NO GRANULOMATA OR DYSPLASIA IDENTIFIED.  . 2016: A. CECUM-BIOPSY COLONIC MUCOSA WITH NO CRYPT DISTORTION, DISARRAY, OR SIGNIFICANT ACTIVE INFLAMMATION.   NEGATIVE FOR GRANULOMAS AND DYSPLASIA. B. COLON-BIOPSY, 70 CM COLONIC MUCOSA WITH NO CRYPT DISTORTION, DISARRAY, OR SIGNIFICANT ACTIVE INFLAMMATION.   NEGATIVE FOR GRANULOMAS AND DYSPLASIA. C. COLON-BIOPSY, 60 CM COLONIC MUCOSA WITH NO CRYPT DISTORTION, DISARRAY, OR SIGNIFICANT ACTIVE INFLAMMATION.   NEGATIVE FOR GRANULOMAS AND DYSPLASIA. D. COLON-BIOPSY, 50 CM COLONIC MUCOSA WITH NO CRYPT DISTORTION, DISARRAY, OR SIGNIFICANT ACTIVE INFLAMMATION.   NEGATIVE FOR GRANULOMAS AND DYSPLASIA. E. COLON-BIOPSY, 40 CM COLONIC MUCOSA WITH NO CRYPT DISTORTION, DISARRAY, OR SIGNIFICANT ACTIVE INFLAMMATION.   NEGATIVE FOR GRANULOMAS AND DYSPLASIA. F. COLON-BIOPSY, 30 CM COLONIC MUCOSA WITH NO CRYPT DISTORTION, DISARRAY, OR SIGNIFICANT ACTIVE INFLAMMATION.   NEGATIVE FOR GRANULOMAS AND DYSPLASIA. G. COLON-BIOPSY, 20 CM COLONIC MUCOSA WITH NO CRYPT DISTORTION, DISARRAY, OR SIGNIFICANT ACTIVE INFLAMMATION.   NEGATIVE FOR GRANULOMAS AND DYSPLASIA. H. RECTUM-BIOPSY CONSISTENT WITH ULCERATIVE COLITIS WITH MILD ACTIVE INFLAMMATION.  NEGATIVE FOR  GRANULOMAS AND DYSPLASIA. INCREASED LYMPHOPLASMACYTIC INFILTRATION, ACTIVE CRYPT INJURY, OR GRANULOMAS,  CONSISTENT WITH QUIESCENT INFLAMMATORY BOWEL DISEASE. H. COLON (BIOPSY), @ 10 CM ACTIVE CHRONIC COLITIS CONSISTENT MILDLY ACTIVE WITH INFLAMMATORY BOWEL DISEASE. NEGATIVE FOR GRANULOMAS AND DYSPLASIA. . 6/2015: A. CECUM-BIOPSY COLONIC MUCOSA WITH NO CRYPT DISTORTION, DISARRAY, OR SIGNIFICANT ACTIVE INFLAMMATION.   NEGATIVE FOR GRANULOMAS AND DYSPLASIA. B. COLON-BIOPSY, 70 CM COLONIC MUCOSA WITH NO CRYPT DISTORTION, DISARRAY, OR SIGNIFICANT ACTIVE INFLAMMATION.   NEGATIVE FOR GRANULOMAS AND DYSPLASIA. C. COLON-BIOPSY, 60 CM COLONIC MUCOSA WITH NO CRYPT DISTORTION, DISARRAY, OR SIGNIFICANT ACTIVE INFLAMMATION.   NEGATIVE FOR GRANULOMAS AND DYSPLASIA. D. COLON-BIOPSY, 50 CM COLONIC MUCOSA WITH NO CRYPT DISTORTION, DISARRAY, OR SIGNIFICANT ACTIVE INFLAMMATION.   NEGATIVE FOR GRANULOMAS AND DYSPLASIA. E. COLON-BIOPSY, 40 CM COLONIC MUCOSA WITH NO CRYPT DISTORTION, DISARRAY, OR SIGNIFICANT ACTIVE INFLAMMATION.   NEGATIVE FOR GRANULOMAS AND DYSPLASIA. F. COLON-BIOPSY, 30 CM COLONIC MUCOSA WITH NO CRYPT DISTORTION, DISARRAY, OR SIGNIFICANT ACTIVE INFLAMMATION.   NEGATIVE FOR GRANULOMAS AND DYSPLASIA. G. COLON-BIOPSY, 20 CM COLONIC MUCOSA WITH NO CRYPT DISTORTION, DISARRAY, OR SIGNIFICANT ACTIVE INFLAMMATION.   NEGATIVE FOR GRANULOMAS AND DYSPLASIA. H. RECTUM-BIOPSY CONSISTENT WITH ULCERATIVE COLITIS WITH MILD ACTIVE INFLAMMATION.  NEGATIVE FOR  GRANULOMAS AND DYSPLASIA. .

## 2021-10-06 ENCOUNTER — OFFICE VISIT (OUTPATIENT)
Dept: URBAN - METROPOLITAN AREA CLINIC 97 | Facility: CLINIC | Age: 66
End: 2021-10-06
Payer: MEDICARE

## 2021-10-06 DIAGNOSIS — K51.80 CHRONIC PANCOLONIC ULCERATIVE COLITIS: ICD-10-CM

## 2021-10-06 PROCEDURE — 96413 CHEMO IV INFUSION 1 HR: CPT | Performed by: INTERNAL MEDICINE

## 2021-10-06 RX ORDER — BUDESONIDE 28 MG/1
1 APPLICATION AEROSOL, FOAM RECTAL
Qty: 56 | Refills: 1 | Status: ACTIVE | COMMUNITY
Start: 2021-09-08 | End: 2021-11-30

## 2021-10-14 LAB
A/G RATIO: 1.4
ALBUMIN: 4.5
ALKALINE PHOSPHATASE: 52
ALT (SGPT): 20
ANTI-INFLIXIMAB ANTIBODY: <22
AST (SGOT): 15
BASO (ABSOLUTE): 0.1
BASOS: 1
BILIRUBIN, TOTAL: 1
BUN/CREATININE RATIO: 25
BUN: 20
CALCIUM: 9.5
CARBON DIOXIDE, TOTAL: 30
CHLORIDE: 99
CREATININE: 0.79
EGFR IF AFRICN AM: 90
EGFR IF NONAFRICN AM: 78
EOS (ABSOLUTE): 0.2
EOS: 4
GLOBULIN, TOTAL: 3.2
GLUCOSE: 108
HEMATOCRIT: 38.3
HEMATOLOGY COMMENTS:: (no result)
HEMOGLOBIN: 12.9
IMMATURE CELLS: (no result)
IMMATURE GRANS (ABS): 0
IMMATURE GRANULOCYTES: 0
INFLIXIMAB DRUG LEVEL: 7.9
LYMPHS (ABSOLUTE): 1.9
LYMPHS: 42
MCH: 31.2
MCHC: 33.7
MCV: 93
MONOCYTES(ABSOLUTE): 0.5
MONOCYTES: 10
NEUTROPHILS (ABSOLUTE): 2
NEUTROPHILS: 43
NRBC: (no result)
PLATELETS: 244
POTASSIUM: 4.9
PROTEIN, TOTAL: 7.7
QUANTIFERON CRITERIA: (no result)
QUANTIFERON INCUBATION: (no result)
QUANTIFERON MITOGEN VALUE: 1.31
QUANTIFERON NIL VALUE: 0.07
QUANTIFERON TB1 AG VALUE: 0.06
QUANTIFERON TB2 AG VALUE: 0.07
QUANTIFERON-TB GOLD PLUS: NEGATIVE
RBC: 4.13
RDW: 13
SODIUM: 136
WBC: 4.6

## 2021-11-16 ENCOUNTER — OFFICE VISIT (OUTPATIENT)
Dept: URBAN - METROPOLITAN AREA CLINIC 97 | Facility: CLINIC | Age: 66
End: 2021-11-16
Payer: MEDICARE

## 2021-11-16 DIAGNOSIS — K51.80 CHRONIC PANCOLONIC ULCERATIVE COLITIS: ICD-10-CM

## 2021-11-16 PROCEDURE — 96413 CHEMO IV INFUSION 1 HR: CPT | Performed by: INTERNAL MEDICINE

## 2021-11-16 RX ORDER — BUDESONIDE 28 MG/1
1 APPLICATION AEROSOL, FOAM RECTAL
Qty: 56 | Refills: 1 | Status: ACTIVE | COMMUNITY
Start: 2021-09-08 | End: 2021-11-30

## 2021-12-15 ENCOUNTER — OFFICE VISIT (OUTPATIENT)
Dept: URBAN - METROPOLITAN AREA CLINIC 98 | Facility: CLINIC | Age: 66
End: 2021-12-15

## 2021-12-23 ENCOUNTER — OFFICE VISIT (OUTPATIENT)
Dept: URBAN - METROPOLITAN AREA TELEHEALTH 2 | Facility: TELEHEALTH | Age: 66
End: 2021-12-23

## 2021-12-28 ENCOUNTER — OFFICE VISIT (OUTPATIENT)
Dept: URBAN - METROPOLITAN AREA CLINIC 97 | Facility: CLINIC | Age: 66
End: 2021-12-28

## 2021-12-29 ENCOUNTER — OFFICE VISIT (OUTPATIENT)
Dept: URBAN - METROPOLITAN AREA CLINIC 73 | Facility: CLINIC | Age: 66
End: 2021-12-29
Payer: MEDICARE

## 2021-12-29 VITALS
TEMPERATURE: 97 F | HEART RATE: 56 BPM | SYSTOLIC BLOOD PRESSURE: 127 MMHG | BODY MASS INDEX: 17.93 KG/M2 | DIASTOLIC BLOOD PRESSURE: 83 MMHG | RESPIRATION RATE: 18 BRPM | WEIGHT: 105 LBS | HEIGHT: 64 IN

## 2021-12-29 DIAGNOSIS — K51.80 CHRONIC PANCOLONIC ULCERATIVE COLITIS: ICD-10-CM

## 2021-12-29 PROCEDURE — 96413 CHEMO IV INFUSION 1 HR: CPT | Performed by: INTERNAL MEDICINE

## 2022-01-05 ENCOUNTER — OFFICE VISIT (OUTPATIENT)
Dept: URBAN - METROPOLITAN AREA CLINIC 97 | Facility: CLINIC | Age: 67
End: 2022-01-05

## 2022-01-12 ENCOUNTER — OFFICE VISIT (OUTPATIENT)
Dept: URBAN - METROPOLITAN AREA CLINIC 96 | Facility: CLINIC | Age: 67
End: 2022-01-12

## 2022-01-12 ENCOUNTER — LAB OUTSIDE AN ENCOUNTER (OUTPATIENT)
Dept: URBAN - METROPOLITAN AREA CLINIC 96 | Facility: CLINIC | Age: 67
End: 2022-01-12

## 2022-01-12 ENCOUNTER — WEB ENCOUNTER (OUTPATIENT)
Dept: URBAN - METROPOLITAN AREA CLINIC 96 | Facility: CLINIC | Age: 67
End: 2022-01-12

## 2022-01-12 ENCOUNTER — OFFICE VISIT (OUTPATIENT)
Dept: URBAN - METROPOLITAN AREA CLINIC 96 | Facility: CLINIC | Age: 67
End: 2022-01-12
Payer: MEDICARE

## 2022-01-12 DIAGNOSIS — K51.00 ULCERATIVE COLITIS, UNIVERSAL, WITHOUT COMPLICATIONS: ICD-10-CM

## 2022-01-12 DIAGNOSIS — Z79.899 LONG-TERM USE OF IMMUNOSUPPRESSANT MEDICATION: ICD-10-CM

## 2022-01-12 DIAGNOSIS — R63.4 WEIGHT LOSS: ICD-10-CM

## 2022-01-12 DIAGNOSIS — R19.7 DIARRHEA: ICD-10-CM

## 2022-01-12 DIAGNOSIS — Z09 FOLLOW UP: ICD-10-CM

## 2022-01-12 DIAGNOSIS — E55.9 VITAMIN D DEFICIENCY: ICD-10-CM

## 2022-01-12 DIAGNOSIS — D51.1 VIT B12 DEFIC ANEMIA D/T SLCTV VIT B12 MALABSORP W PROTEIN: ICD-10-CM

## 2022-01-12 DIAGNOSIS — R15.2 FECAL URGENCY: ICD-10-CM

## 2022-01-12 PROCEDURE — 99215 OFFICE O/P EST HI 40 MIN: CPT | Performed by: INTERNAL MEDICINE

## 2022-01-12 RX ORDER — INFLIXIMAB 100 MG/10ML
AS DIRECTED INJECTION, POWDER, LYOPHILIZED, FOR SOLUTION INTRAVENOUS
Qty: 100 MILLIGRAMS | Refills: 0 | OUTPATIENT
Start: 2022-03-23 | End: 2022-04-22

## 2022-01-12 RX ORDER — PREDNISONE 10 MG/1
2 TABLETS TABLET ORAL ONCE A DAY
Qty: 60 | Refills: 0 | OUTPATIENT
Start: 2022-01-12 | End: 2022-02-11

## 2022-01-12 RX ORDER — METRONIDAZOLE 500 MG/1
1 TABLET TABLET, FILM COATED ORAL
Qty: 28 TABLET | Refills: 0 | OUTPATIENT
Start: 2022-01-12 | End: 2022-01-26

## 2022-01-12 NOTE — HPI-TODAY'S VISIT:
Pt Eloy is a 65 y/o with h/o pan-UC, currently on Remicade (every 6 weeks 7.5mg/kg) here for refractory dz. . Pt is referred by Dr. Morton. . She was diagnosed with UC around 34 years of age.  She had severe inflammation and diarrhea/bleeding.  Was given steroids.  She started Remicade in 2011, which has worked very well for her. She has used suppositories (had reactive diarrhea with Canasa)  and oral agents. . Previously on 11/11/2019, pt reports that she has up 1-2 bm per day, no blood in the  stool, no abdominal pain.  No nausea or vomiting. Stress does exacerbate her sxs.  We started on Colazol . Previously on 12/10/2019, pt reports that she started to experience worsening diarrhea after starting the Colazol.  She got a sooner dose of Remicade. She stopped the Colazol and diarrhea improved.  However, she seems to have breakthrough sxs at 5-6 weeks.  She is prednsione 20mg. . Previously on 7/8/2020, pt reports that she had pneumonia (first time) on December 26, 2019.  She has 4-5 BM per day.  No blood in the stool.  She sees breakthrough of sxs about 2 week prior to her next infusion. . Previously on 5/5/2021, pt reports that she continues to have good and bad days with her UC.  She seems to have breakthrough sxs . Previously on 9/8/2021, pt reports that she has no blood and diarrhea; she reports that  . Today on 1/12/2022, pt reports that she is having a lot of diarrhea, waking up at night for BM.  She is having bowel incontinence as well. . No joint pain or rashes. . Labs: 7/2020: IFX trough level 7.2 and AB neg 11/2019: IFX 13 (AB 36) every 7 week at 5mg/kg  2 week trough level, quant-gold indeterminate, CRP 7, ESR 11 11/2018: Hgb 13.1, ast 21, alt 19; hep B neg; B12 558 . SH: no smoke/etoh; some MJ use FH: no IBD . Colonoscopy: 8/2021: The ascending colon appeared normal. Biopsies were taken with a cold forceps for histology. The pathology specimen was placed into Bottle Number 1. Findings: The transverse colon appeared normal. Biopsies were taken with a cold forceps for histology. The pathology specimen was placed into Bottle Number 2. The descending colon appeared normal. Biopsies were taken with a cold forceps for histology. The pathology specimen was placed into Bottle Number 3. A diffuse area of mildly erythematous mucosa was found in the sigmoid colon. This was biopsied with a cold forceps for histology. The pathology specimen was placed into Bottle Number 4. A continuous area of nonbleeding ulcerated mucosa with no stigmata of recent bleeding was present in the rectum and in the recto-sigmoid colon. Biopsies were taken with a cold forceps for histology. The pathology specimen was placed into Bottle Number 5 . (A) Colon, Ascending, Biopsy: NO SIGNIFICANT ABNORMALITY. (B) Colon, Transverse, Biopsy: NO SIGNIFICANT ABNORMALITY. (C) Colon, Descending, Biopsy: NO SIGNIFICANT ABNORMALITY. (D) Colon, Sigmoid, Biopsy: NO SIGNIFICANT ABNORMALITY. (E) Rectum, Biopsy: DIFFUSE CHRONIC ACTIVE PROCTITIS, CONSISTENT WITH ULCERATIVE COLITIS. See Comment. Negative for Infectious Organisms, Dysplasia or Malignancy. COMMENT: Ulcerative colitis that is limited to the rectum has been referred to as ulcerative proctitis. This disease is thought to be less severe and to have a lower rate of dysplasia and adenocarcinoma than cases of ulcerative pan-colitis. MICROSCOPIC DESCRIPT . 1/2019: A. Cecum , Biopsy: -Focal active colitis (see comment). -Negative for dysplasia or malignancy. B. Colon 70cm , Biopsy: -Colonic mucosa with no diagnostic abnormality.  -No evidence of chronic or active colitis.  -No granulomas seen.  -Negative for dysplasia or malignancy. C. Ascending Colon Polyp, Polypectomy: -Sessile serrated polyp. D. Colon 60cm , Biopsy: -Colonic mucosa with no diagnostic abnormality.  -No evidence of chronic or active colitis.  -No granulomas seen.  -Negative for dysplasia or malignancy. E. Colon 50cm , Biopsy: -Colonic mucosa with no diagnostic abnormality.  -No evidence of chronic or active colitis.  -No granulomas seen.  -Negative for dysplasia or malignancy. F. Colon 40cm , Biopsy: -Colonic mucosa with no diagnostic abnormality.  -No evidence of chronic or active colitis.  -No granulomas seen.  -Negative for dysplasia or malignancy. G. Colon 30cm , Biopsy: -Colonic mucosa with no diagnostic abnormality.  -No evidence of chronic or active colitis.  -No granulomas seen.  -Negative for dysplasia or malignancy. H. Colon 20cm , Biopsy: -Chronic active colitis, minimal, consistent with history of Ulcerative Colitis. -No granulomas, negative for dysplasia or malignancy. I . Rectum , Biopsy: -Chronic active colitis, mild, consistent with history of Ulcerative Colitis. -No granulomas, negative for dysplasia or malignancy. . 2017: A. CECUM (BIOPSY):     COLONIC MUCOSA WITH NO SIGNIFICANT HISTOPATHOLOGY.     NO HISTOLOGIC EVIDENCE OF ACTIVE, CHRONIC OR MICROSCOPIC COLITIS.  NO GRANULOMATA OR DYSPLASIA IDENTIFIED.    B. COLON, 70 CM (BIOPSY):     COLONIC MUCOSA WITH NO SIGNIFICANT HISTOPATHOLOGY.     NO HISTOLOGIC EVIDENCE OF ACTIVE, CHRONIC OR MICROSCOPIC COLITIS.  NO GRANULOMATA OR DYSPLASIA IDENTIFIED.      C. COLON, 60 CM (BIOPSY):      COLONIC MUCOSA WITH NO SIGNIFICANT HISTOPATHOLOGY.     NO HISTOLOGIC EVIDENCE OF ACTIVE, CHRONIC OR MICROSCOPIC COLITIS.  NO GRANULOMATA OR DYSPLASIA IDENTIFIED.      D. COLON, 50 CM (BIOPSY):     COLONIC MUCOSA WITH NO SIGNIFICANT HISTOPATHOLOGY.     NO HISTOLOGIC EVIDENCE OF ACTIVE, CHRONIC OR MICROSCOPIC COLITIS.  NO GRANULOMATA OR DYSPLASIA IDENTIFIED.      E. COLON, 40 CM (BIOPSY):     CHRONIC INACTIVE COLITIS.  NO GRANULOMATA OR DYSPLASIA IDENTIFIED.    F. COLON, 30 CM (BIOPSY):     CHRONIC INACTIVE COLITIS.  NO GRANULOMATA OR DYSPLASIA IDENTIFIED.      G. COLON, 20 CM   (BIOPSY):     CHRONIC INACTIVE COLITIS.  NO GRANULOMATA OR DYSPLASIA IDENTIFIED.      H. RECTUM, 10 CM (BIOPSY):     CHRONIC MILDLY ACTIVE COLITIS/PROCTITIS.  NO GRANULOMATA OR DYSPLASIA IDENTIFIED.  . 2016: A. CECUM-BIOPSY COLONIC MUCOSA WITH NO CRYPT DISTORTION, DISARRAY, OR SIGNIFICANT ACTIVE INFLAMMATION.   NEGATIVE FOR GRANULOMAS AND DYSPLASIA. B. COLON-BIOPSY, 70 CM COLONIC MUCOSA WITH NO CRYPT DISTORTION, DISARRAY, OR SIGNIFICANT ACTIVE INFLAMMATION.   NEGATIVE FOR GRANULOMAS AND DYSPLASIA. C. COLON-BIOPSY, 60 CM COLONIC MUCOSA WITH NO CRYPT DISTORTION, DISARRAY, OR SIGNIFICANT ACTIVE INFLAMMATION.   NEGATIVE FOR GRANULOMAS AND DYSPLASIA. D. COLON-BIOPSY, 50 CM COLONIC MUCOSA WITH NO CRYPT DISTORTION, DISARRAY, OR SIGNIFICANT ACTIVE INFLAMMATION.   NEGATIVE FOR GRANULOMAS AND DYSPLASIA. E. COLON-BIOPSY, 40 CM COLONIC MUCOSA WITH NO CRYPT DISTORTION, DISARRAY, OR SIGNIFICANT ACTIVE INFLAMMATION.   NEGATIVE FOR GRANULOMAS AND DYSPLASIA. F. COLON-BIOPSY, 30 CM COLONIC MUCOSA WITH NO CRYPT DISTORTION, DISARRAY, OR SIGNIFICANT ACTIVE INFLAMMATION.   NEGATIVE FOR GRANULOMAS AND DYSPLASIA. G. COLON-BIOPSY, 20 CM COLONIC MUCOSA WITH NO CRYPT DISTORTION, DISARRAY, OR SIGNIFICANT ACTIVE INFLAMMATION.   NEGATIVE FOR GRANULOMAS AND DYSPLASIA. H. RECTUM-BIOPSY CONSISTENT WITH ULCERATIVE COLITIS WITH MILD ACTIVE INFLAMMATION.  NEGATIVE FOR  GRANULOMAS AND DYSPLASIA. INCREASED LYMPHOPLASMACYTIC INFILTRATION, ACTIVE CRYPT INJURY, OR GRANULOMAS,  CONSISTENT WITH QUIESCENT INFLAMMATORY BOWEL DISEASE. H. COLON (BIOPSY), @ 10 CM ACTIVE CHRONIC COLITIS CONSISTENT MILDLY ACTIVE WITH INFLAMMATORY BOWEL DISEASE. NEGATIVE FOR GRANULOMAS AND DYSPLASIA. . 6/2015: A. CECUM-BIOPSY COLONIC MUCOSA WITH NO CRYPT DISTORTION, DISARRAY, OR SIGNIFICANT ACTIVE INFLAMMATION.   NEGATIVE FOR GRANULOMAS AND DYSPLASIA. B. COLON-BIOPSY, 70 CM COLONIC MUCOSA WITH NO CRYPT DISTORTION, DISARRAY, OR SIGNIFICANT ACTIVE INFLAMMATION.   NEGATIVE FOR GRANULOMAS AND DYSPLASIA. C. COLON-BIOPSY, 60 CM COLONIC MUCOSA WITH NO CRYPT DISTORTION, DISARRAY, OR SIGNIFICANT ACTIVE INFLAMMATION.   NEGATIVE FOR GRANULOMAS AND DYSPLASIA. D. COLON-BIOPSY, 50 CM COLONIC MUCOSA WITH NO CRYPT DISTORTION, DISARRAY, OR SIGNIFICANT ACTIVE INFLAMMATION.   NEGATIVE FOR GRANULOMAS AND DYSPLASIA. E. COLON-BIOPSY, 40 CM COLONIC MUCOSA WITH NO CRYPT DISTORTION, DISARRAY, OR SIGNIFICANT ACTIVE INFLAMMATION.   NEGATIVE FOR GRANULOMAS AND DYSPLASIA. F. COLON-BIOPSY, 30 CM COLONIC MUCOSA WITH NO CRYPT DISTORTION, DISARRAY, OR SIGNIFICANT ACTIVE INFLAMMATION.   NEGATIVE FOR GRANULOMAS AND DYSPLASIA. G. COLON-BIOPSY, 20 CM COLONIC MUCOSA WITH NO CRYPT DISTORTION, DISARRAY, OR SIGNIFICANT ACTIVE INFLAMMATION.   NEGATIVE FOR GRANULOMAS AND DYSPLASIA. H. RECTUM-BIOPSY CONSISTENT WITH ULCERATIVE COLITIS WITH MILD ACTIVE INFLAMMATION.  NEGATIVE FOR  GRANULOMAS AND DYSPLASIA. .

## 2022-01-13 ENCOUNTER — LAB OUTSIDE AN ENCOUNTER (OUTPATIENT)
Dept: URBAN - METROPOLITAN AREA CLINIC 96 | Facility: CLINIC | Age: 67
End: 2022-01-13

## 2022-01-13 LAB
C DIFF CONSIST: (no result)
C DIFF QC: (no result)
CLOSTRIDIUM DIFFICILE PREVIOUS TEST DATE: (no result)
CLOSTRIDIUM DIFFICILE TOXIN/ANTIGEN: NOT DETECTED
PERFORMING LAB: (no result)

## 2022-01-15 LAB
CALPROTECTIN FECES: (no result)
PERFORMING LAB: (no result)

## 2022-02-08 ENCOUNTER — TELEPHONE ENCOUNTER (OUTPATIENT)
Dept: URBAN - METROPOLITAN AREA CLINIC 18 | Facility: CLINIC | Age: 67
End: 2022-02-08

## 2022-02-08 ENCOUNTER — OFFICE VISIT (OUTPATIENT)
Dept: URBAN - METROPOLITAN AREA CLINIC 97 | Facility: CLINIC | Age: 67
End: 2022-02-08
Payer: MEDICARE

## 2022-02-08 DIAGNOSIS — K51.80 CHRONIC PANCOLONIC ULCERATIVE COLITIS: ICD-10-CM

## 2022-02-08 PROCEDURE — 96365 THER/PROPH/DIAG IV INF INIT: CPT | Performed by: INTERNAL MEDICINE

## 2022-02-08 RX ORDER — PREDNISONE 10 MG/1
2 TABLETS TABLET ORAL ONCE A DAY
Qty: 60 | Refills: 0
Start: 2022-01-12 | End: 2022-03-11

## 2022-02-08 RX ORDER — PREDNISONE 10 MG/1
2 TABLETS TABLET ORAL ONCE A DAY
Qty: 60 | Refills: 0 | Status: ACTIVE | COMMUNITY
Start: 2022-01-12 | End: 2022-02-11

## 2022-03-08 ENCOUNTER — OFFICE VISIT (OUTPATIENT)
Dept: URBAN - METROPOLITAN AREA CLINIC 96 | Facility: CLINIC | Age: 67
End: 2022-03-08

## 2022-03-22 ENCOUNTER — TELEPHONE ENCOUNTER (OUTPATIENT)
Dept: URBAN - METROPOLITAN AREA CLINIC 18 | Facility: CLINIC | Age: 67
End: 2022-03-22

## 2022-03-22 ENCOUNTER — OFFICE VISIT (OUTPATIENT)
Dept: URBAN - METROPOLITAN AREA CLINIC 97 | Facility: CLINIC | Age: 67
End: 2022-03-22
Payer: MEDICARE

## 2022-03-22 DIAGNOSIS — K51.80 CHRONIC PANCOLONIC ULCERATIVE COLITIS: ICD-10-CM

## 2022-03-22 PROCEDURE — 96365 THER/PROPH/DIAG IV INF INIT: CPT | Performed by: INTERNAL MEDICINE

## 2022-04-28 ENCOUNTER — CLAIMS CREATED FROM THE CLAIM WINDOW (OUTPATIENT)
Dept: URBAN - METROPOLITAN AREA CLINIC 97 | Facility: CLINIC | Age: 67
End: 2022-04-28
Payer: MEDICARE

## 2022-04-28 ENCOUNTER — CLAIMS CREATED FROM THE CLAIM WINDOW (OUTPATIENT)
Dept: URBAN - METROPOLITAN AREA CLINIC 97 | Facility: CLINIC | Age: 67
End: 2022-04-28

## 2022-04-28 ENCOUNTER — OFFICE VISIT (OUTPATIENT)
Dept: URBAN - METROPOLITAN AREA CLINIC 97 | Facility: CLINIC | Age: 67
End: 2022-04-28

## 2022-04-28 VITALS
DIASTOLIC BLOOD PRESSURE: 84 MMHG | RESPIRATION RATE: 18 BRPM | SYSTOLIC BLOOD PRESSURE: 134 MMHG | WEIGHT: 104 LBS | HEIGHT: 64 IN | HEART RATE: 64 BPM | BODY MASS INDEX: 17.75 KG/M2 | TEMPERATURE: 97.4 F

## 2022-04-28 DIAGNOSIS — K51.80 CHRONIC PANCOLONIC ULCERATIVE COLITIS: ICD-10-CM

## 2022-04-28 PROCEDURE — 96365 THER/PROPH/DIAG IV INF INIT: CPT | Performed by: INTERNAL MEDICINE

## 2022-05-10 ENCOUNTER — TELEPHONE ENCOUNTER (OUTPATIENT)
Dept: URBAN - METROPOLITAN AREA CLINIC 92 | Facility: CLINIC | Age: 67
End: 2022-05-10

## 2022-06-06 ENCOUNTER — OFFICE VISIT (OUTPATIENT)
Dept: URBAN - METROPOLITAN AREA CLINIC 97 | Facility: CLINIC | Age: 67
End: 2022-06-06
Payer: MEDICARE

## 2022-06-06 VITALS
HEART RATE: 71 BPM | RESPIRATION RATE: 18 BRPM | SYSTOLIC BLOOD PRESSURE: 129 MMHG | WEIGHT: 104 LBS | DIASTOLIC BLOOD PRESSURE: 65 MMHG | TEMPERATURE: 96.3 F | HEIGHT: 64 IN | BODY MASS INDEX: 17.75 KG/M2

## 2022-06-06 DIAGNOSIS — K51.80 CHRONIC PANCOLONIC ULCERATIVE COLITIS: ICD-10-CM

## 2022-06-06 PROCEDURE — 96365 THER/PROPH/DIAG IV INF INIT: CPT | Performed by: INTERNAL MEDICINE

## 2022-06-08 ENCOUNTER — TELEPHONE ENCOUNTER (OUTPATIENT)
Dept: URBAN - METROPOLITAN AREA CLINIC 98 | Facility: CLINIC | Age: 67
End: 2022-06-08

## 2022-06-09 ENCOUNTER — OFFICE VISIT (OUTPATIENT)
Dept: URBAN - METROPOLITAN AREA CLINIC 97 | Facility: CLINIC | Age: 67
End: 2022-06-09

## 2022-07-12 ENCOUNTER — OFFICE VISIT (OUTPATIENT)
Dept: URBAN - METROPOLITAN AREA CLINIC 97 | Facility: CLINIC | Age: 67
End: 2022-07-12
Payer: MEDICARE

## 2022-07-12 VITALS
DIASTOLIC BLOOD PRESSURE: 84 MMHG | SYSTOLIC BLOOD PRESSURE: 118 MMHG | TEMPERATURE: 96.8 F | BODY MASS INDEX: 18.27 KG/M2 | HEART RATE: 67 BPM | WEIGHT: 107 LBS | RESPIRATION RATE: 16 BRPM | HEIGHT: 64 IN

## 2022-07-12 DIAGNOSIS — K51.80 CHRONIC PANCOLONIC ULCERATIVE COLITIS: ICD-10-CM

## 2022-07-12 PROCEDURE — 96365 THER/PROPH/DIAG IV INF INIT: CPT | Performed by: INTERNAL MEDICINE

## 2022-08-23 ENCOUNTER — OFFICE VISIT (OUTPATIENT)
Dept: URBAN - METROPOLITAN AREA CLINIC 97 | Facility: CLINIC | Age: 67
End: 2022-08-23
Payer: MEDICARE

## 2022-08-23 VITALS
HEART RATE: 51 BPM | WEIGHT: 107 LBS | RESPIRATION RATE: 20 BRPM | BODY MASS INDEX: 18.27 KG/M2 | DIASTOLIC BLOOD PRESSURE: 80 MMHG | HEIGHT: 64 IN | SYSTOLIC BLOOD PRESSURE: 129 MMHG | TEMPERATURE: 98.1 F

## 2022-08-23 DIAGNOSIS — K51.90 ACUTE ULCERATIVE COLITIS: ICD-10-CM

## 2022-08-23 PROCEDURE — 96365 THER/PROPH/DIAG IV INF INIT: CPT | Performed by: INTERNAL MEDICINE

## 2022-10-04 ENCOUNTER — OFFICE VISIT (OUTPATIENT)
Dept: URBAN - METROPOLITAN AREA CLINIC 97 | Facility: CLINIC | Age: 67
End: 2022-10-04
Payer: MEDICARE

## 2022-10-04 VITALS
TEMPERATURE: 96.7 F | BODY MASS INDEX: 18.44 KG/M2 | DIASTOLIC BLOOD PRESSURE: 79 MMHG | HEIGHT: 64 IN | WEIGHT: 108 LBS | SYSTOLIC BLOOD PRESSURE: 128 MMHG | HEART RATE: 57 BPM | RESPIRATION RATE: 18 BRPM

## 2022-10-04 DIAGNOSIS — K51.90 ACUTE ULCERATIVE COLITIS: ICD-10-CM

## 2022-10-04 PROCEDURE — 96365 THER/PROPH/DIAG IV INF INIT: CPT | Performed by: INTERNAL MEDICINE

## 2022-11-14 ENCOUNTER — OFFICE VISIT (OUTPATIENT)
Dept: URBAN - METROPOLITAN AREA CLINIC 97 | Facility: CLINIC | Age: 67
End: 2022-11-14
Payer: MEDICARE

## 2022-11-14 ENCOUNTER — TELEPHONE ENCOUNTER (OUTPATIENT)
Dept: URBAN - METROPOLITAN AREA CLINIC 97 | Facility: CLINIC | Age: 67
End: 2022-11-14

## 2022-11-14 VITALS
HEIGHT: 64 IN | WEIGHT: 104 LBS | SYSTOLIC BLOOD PRESSURE: 123 MMHG | BODY MASS INDEX: 17.75 KG/M2 | TEMPERATURE: 96 F | RESPIRATION RATE: 17 BRPM | HEART RATE: 70 BPM | DIASTOLIC BLOOD PRESSURE: 79 MMHG

## 2022-11-14 DIAGNOSIS — K51.80 CHRONIC PANCOLONIC ULCERATIVE COLITIS: ICD-10-CM

## 2022-11-14 PROCEDURE — 96365 THER/PROPH/DIAG IV INF INIT: CPT | Performed by: INTERNAL MEDICINE

## 2022-11-29 ENCOUNTER — CLAIMS CREATED FROM THE CLAIM WINDOW (OUTPATIENT)
Dept: URBAN - METROPOLITAN AREA CLINIC 96 | Facility: CLINIC | Age: 67
End: 2022-11-29
Payer: MEDICARE

## 2022-11-29 ENCOUNTER — LAB OUTSIDE AN ENCOUNTER (OUTPATIENT)
Dept: URBAN - METROPOLITAN AREA CLINIC 96 | Facility: CLINIC | Age: 67
End: 2022-11-29

## 2022-11-29 ENCOUNTER — WEB ENCOUNTER (OUTPATIENT)
Dept: URBAN - METROPOLITAN AREA CLINIC 96 | Facility: CLINIC | Age: 67
End: 2022-11-29

## 2022-11-29 VITALS
DIASTOLIC BLOOD PRESSURE: 65 MMHG | RESPIRATION RATE: 18 BRPM | HEART RATE: 56 BPM | WEIGHT: 103 LBS | HEIGHT: 64 IN | TEMPERATURE: 98.4 F | BODY MASS INDEX: 17.58 KG/M2 | SYSTOLIC BLOOD PRESSURE: 110 MMHG

## 2022-11-29 DIAGNOSIS — R19.7 DIARRHEA: ICD-10-CM

## 2022-11-29 DIAGNOSIS — D51.1 VIT B12 DEFIC ANEMIA D/T SLCTV VIT B12 MALABSORP W PROTEIN: ICD-10-CM

## 2022-11-29 DIAGNOSIS — M81.0 OSTEOPOROSIS: ICD-10-CM

## 2022-11-29 DIAGNOSIS — E55.9 VITAMIN D DEFICIENCY: ICD-10-CM

## 2022-11-29 DIAGNOSIS — Z91.89 COLON CANCER HIGH RISK: ICD-10-CM

## 2022-11-29 DIAGNOSIS — Z09 FOLLOW UP: ICD-10-CM

## 2022-11-29 DIAGNOSIS — Z79.899 LONG-TERM USE OF IMMUNOSUPPRESSANT MEDICATION: ICD-10-CM

## 2022-11-29 DIAGNOSIS — K51.80 CHRONIC PANCOLONIC ULCERATIVE COLITIS: ICD-10-CM

## 2022-11-29 DIAGNOSIS — K51.00 ULCERATIVE COLITIS, UNIVERSAL, WITHOUT COMPLICATIONS: ICD-10-CM

## 2022-11-29 PROBLEM — 34713006 VITAMIN D DEFICIENCY: Status: ACTIVE | Noted: 2021-05-05

## 2022-11-29 PROBLEM — 64859006 OSTEOPOROSIS: Status: ACTIVE | Noted: 2022-11-29

## 2022-11-29 PROCEDURE — 99215 OFFICE O/P EST HI 40 MIN: CPT | Performed by: INTERNAL MEDICINE

## 2022-11-29 RX ORDER — PREDNISONE 10 MG/1
1 TABLET TABLET ORAL ONCE A DAY
Qty: 14 TABLET | Refills: 0 | OUTPATIENT
Start: 2022-11-29 | End: 2022-12-13

## 2022-11-29 RX ORDER — SODIUM SULFATE, MAGNESIUM SULFATE, AND POTASSIUM CHLORIDE 17.75; 2.7; 2.25 G/1; G/1; G/1
AS DIRECTED TABLET ORAL ONCE
Qty: 1 | Refills: 0 | OUTPATIENT
Start: 2022-11-29 | End: 2022-11-30

## 2022-11-29 RX ORDER — INFLIXIMAB 100 MG/10ML
AS DIRECTED INJECTION, POWDER, LYOPHILIZED, FOR SOLUTION INTRAVENOUS
Qty: 100 | Refills: 0 | OUTPATIENT
Start: 2022-11-29 | End: 2022-12-29

## 2022-11-29 NOTE — HPI-TODAY'S VISIT:
Pt Eloy is a 66 y/o with h/o pan-UC, currently on Remicade (every 6 weeks 7.5mg/kg) here for refractory dz. . Pt is referred by Dr. Morton. . She was diagnosed with UC around 34 years of age.  She had severe inflammation and diarrhea/bleeding.  Was given steroids.  She started Remicade in 2011, which has worked very well for her. She has used suppositories (had reactive diarrhea with Canasa) and oral agents. . Previously on 11/11/2019, pt reports that she has up 1-2 bm per day, no blood in the  stool, no abdominal pain.  No nausea or vomiting. Stress does exacerbate her sxs.  We started on Colazol . Previously on 12/10/2019, pt reports that she started to experience worsening diarrhea after starting the Colazol.  She got a sooner dose of Remicade. She stopped the Colazol and diarrhea improved.  However, she seems to have breakthrough sxs at 5-6 weeks.  She is prednsione 20mg. . Previously on 7/8/2020, pt reports that she had pneumonia (first time) on December 26, 2019.  She has 4-5 BM per day.  No blood in the stool.  She sees breakthrough of sxs about 2 week prior to her next infusion. Previously on 5/5/2021, pt reports that she continues to have good and bad days with her UC.  She seems to have breakthrough sxs Previously on 9/8/2021, pt reports that she has no blood and diarrhea; she reports that  Previously on 1/12/2022, pt reports that she is having a lot of diarrhea, waking up at night for BM.  She is having bowel incontinence as well. (trigger was alcohol and coffee) . Today on 11/29/2022, pt reports that she has been doing very well, overate on Thanksgiving.  Remicade is doing very well, no rectal bleeding, no urgency. . No joint pain or rashes. . Labs: 7/2020: IFX trough level 7.2 and AB neg 11/2019: IFX 13 (AB 36) every 7 week at 5mg/kg  2 week trough level, quant-gold indeterminate, CRP 7, ESR 11 11/2018: Hgb 13.1, ast 21, alt 19; hep B neg; B12 558 . SH: no smoke/etoh; some MJ use FH: no IBD . Colonoscopy: 8/2021: The ascending colon appeared normal. Biopsies were taken with a cold forceps for histology. The pathology specimen was placed into Bottle Number 1. Findings: The transverse colon appeared normal. Biopsies were taken with a cold forceps for histology. The pathology specimen was placed into Bottle Number 2. The descending colon appeared normal. Biopsies were taken with a cold forceps for histology. The pathology specimen was placed into Bottle Number 3. A diffuse area of mildly erythematous mucosa was found in the sigmoid colon. This was biopsied with a cold forceps for histology. The pathology specimen was placed into Bottle Number 4. A continuous area of nonbleeding ulcerated mucosa with no stigmata of recent bleeding was present in the rectum and in the recto-sigmoid colon. Biopsies were taken with a cold forceps for histology. The pathology specimen was placed into Bottle Number 5 . (A) Colon, Ascending, Biopsy: NO SIGNIFICANT ABNORMALITY. (B) Colon, Transverse, Biopsy: NO SIGNIFICANT ABNORMALITY. (C) Colon, Descending, Biopsy: NO SIGNIFICANT ABNORMALITY. (D) Colon, Sigmoid, Biopsy: NO SIGNIFICANT ABNORMALITY. (E) Rectum, Biopsy: DIFFUSE CHRONIC ACTIVE PROCTITIS, CONSISTENT WITH ULCERATIVE COLITIS. See Comment. Negative for Infectious Organisms, Dysplasia or Malignancy. COMMENT: Ulcerative colitis that is limited to the rectum has been referred to as ulcerative proctitis. This disease is thought to be less severe and to have a lower rate of dysplasia and adenocarcinoma than cases of ulcerative pan-colitis. MICROSCOPIC DESCRIPT . 1/2019: A. Cecum , Biopsy: -Focal active colitis (see comment). -Negative for dysplasia or malignancy. B. Colon 70cm , Biopsy: -Colonic mucosa with no diagnostic abnormality.  -No evidence of chronic or active colitis.  -No granulomas seen.  -Negative for dysplasia or malignancy. C. Ascending Colon Polyp, Polypectomy: -Sessile serrated polyp. D. Colon 60cm , Biopsy: -Colonic mucosa with no diagnostic abnormality.  -No evidence of chronic or active colitis.  -No granulomas seen.  -Negative for dysplasia or malignancy. E. Colon 50cm , Biopsy: -Colonic mucosa with no diagnostic abnormality.  -No evidence of chronic or active colitis.  -No granulomas seen.  -Negative for dysplasia or malignancy. F. Colon 40cm , Biopsy: -Colonic mucosa with no diagnostic abnormality.  -No evidence of chronic or active colitis.  -No granulomas seen.  -Negative for dysplasia or malignancy. G. Colon 30cm , Biopsy: -Colonic mucosa with no diagnostic abnormality.  -No evidence of chronic or active colitis.  -No granulomas seen.  -Negative for dysplasia or malignancy. H. Colon 20cm , Biopsy: -Chronic active colitis, minimal, consistent with history of Ulcerative Colitis. -No granulomas, negative for dysplasia or malignancy. I . Rectum , Biopsy: -Chronic active colitis, mild, consistent with history of Ulcerative Colitis. -No granulomas, negative for dysplasia or malignancy. . 2017: A. CECUM (BIOPSY):     COLONIC MUCOSA WITH NO SIGNIFICANT HISTOPATHOLOGY.     NO HISTOLOGIC EVIDENCE OF ACTIVE, CHRONIC OR MICROSCOPIC COLITIS.  NO GRANULOMATA OR DYSPLASIA IDENTIFIED.    B. COLON, 70 CM (BIOPSY):     COLONIC MUCOSA WITH NO SIGNIFICANT HISTOPATHOLOGY.     NO HISTOLOGIC EVIDENCE OF ACTIVE, CHRONIC OR MICROSCOPIC COLITIS.  NO GRANULOMATA OR DYSPLASIA IDENTIFIED.      C. COLON, 60 CM (BIOPSY):      COLONIC MUCOSA WITH NO SIGNIFICANT HISTOPATHOLOGY.     NO HISTOLOGIC EVIDENCE OF ACTIVE, CHRONIC OR MICROSCOPIC COLITIS.  NO GRANULOMATA OR DYSPLASIA IDENTIFIED.      D. COLON, 50 CM (BIOPSY):     COLONIC MUCOSA WITH NO SIGNIFICANT HISTOPATHOLOGY.     NO HISTOLOGIC EVIDENCE OF ACTIVE, CHRONIC OR MICROSCOPIC COLITIS.  NO GRANULOMATA OR DYSPLASIA IDENTIFIED.      E. COLON, 40 CM (BIOPSY):     CHRONIC INACTIVE COLITIS.  NO GRANULOMATA OR DYSPLASIA IDENTIFIED.    F. COLON, 30 CM (BIOPSY):     CHRONIC INACTIVE COLITIS.  NO GRANULOMATA OR DYSPLASIA IDENTIFIED.      G. COLON, 20 CM   (BIOPSY):     CHRONIC INACTIVE COLITIS.  NO GRANULOMATA OR DYSPLASIA IDENTIFIED.      H. RECTUM, 10 CM (BIOPSY):     CHRONIC MILDLY ACTIVE COLITIS/PROCTITIS.  NO GRANULOMATA OR DYSPLASIA IDENTIFIED.  . 2016: A. CECUM-BIOPSY COLONIC MUCOSA WITH NO CRYPT DISTORTION, DISARRAY, OR SIGNIFICANT ACTIVE INFLAMMATION.   NEGATIVE FOR GRANULOMAS AND DYSPLASIA. B. COLON-BIOPSY, 70 CM COLONIC MUCOSA WITH NO CRYPT DISTORTION, DISARRAY, OR SIGNIFICANT ACTIVE INFLAMMATION.   NEGATIVE FOR GRANULOMAS AND DYSPLASIA. C. COLON-BIOPSY, 60 CM COLONIC MUCOSA WITH NO CRYPT DISTORTION, DISARRAY, OR SIGNIFICANT ACTIVE INFLAMMATION.   NEGATIVE FOR GRANULOMAS AND DYSPLASIA. D. COLON-BIOPSY, 50 CM COLONIC MUCOSA WITH NO CRYPT DISTORTION, DISARRAY, OR SIGNIFICANT ACTIVE INFLAMMATION.   NEGATIVE FOR GRANULOMAS AND DYSPLASIA. E. COLON-BIOPSY, 40 CM COLONIC MUCOSA WITH NO CRYPT DISTORTION, DISARRAY, OR SIGNIFICANT ACTIVE INFLAMMATION.   NEGATIVE FOR GRANULOMAS AND DYSPLASIA. F. COLON-BIOPSY, 30 CM COLONIC MUCOSA WITH NO CRYPT DISTORTION, DISARRAY, OR SIGNIFICANT ACTIVE INFLAMMATION.   NEGATIVE FOR GRANULOMAS AND DYSPLASIA. G. COLON-BIOPSY, 20 CM COLONIC MUCOSA WITH NO CRYPT DISTORTION, DISARRAY, OR SIGNIFICANT ACTIVE INFLAMMATION.   NEGATIVE FOR GRANULOMAS AND DYSPLASIA. H. RECTUM-BIOPSY CONSISTENT WITH ULCERATIVE COLITIS WITH MILD ACTIVE INFLAMMATION.  NEGATIVE FOR  GRANULOMAS AND DYSPLASIA. INCREASED LYMPHOPLASMACYTIC INFILTRATION, ACTIVE CRYPT INJURY, OR GRANULOMAS,  CONSISTENT WITH QUIESCENT INFLAMMATORY BOWEL DISEASE. H. COLON (BIOPSY), @ 10 CM ACTIVE CHRONIC COLITIS CONSISTENT MILDLY ACTIVE WITH INFLAMMATORY BOWEL DISEASE. NEGATIVE FOR GRANULOMAS AND DYSPLASIA. . 6/2015: A. CECUM-BIOPSY COLONIC MUCOSA WITH NO CRYPT DISTORTION, DISARRAY, OR SIGNIFICANT ACTIVE INFLAMMATION.   NEGATIVE FOR GRANULOMAS AND DYSPLASIA. B. COLON-BIOPSY, 70 CM COLONIC MUCOSA WITH NO CRYPT DISTORTION, DISARRAY, OR SIGNIFICANT ACTIVE INFLAMMATION.   NEGATIVE FOR GRANULOMAS AND DYSPLASIA. C. COLON-BIOPSY, 60 CM COLONIC MUCOSA WITH NO CRYPT DISTORTION, DISARRAY, OR SIGNIFICANT ACTIVE INFLAMMATION.   NEGATIVE FOR GRANULOMAS AND DYSPLASIA. D. COLON-BIOPSY, 50 CM COLONIC MUCOSA WITH NO CRYPT DISTORTION, DISARRAY, OR SIGNIFICANT ACTIVE INFLAMMATION.   NEGATIVE FOR GRANULOMAS AND DYSPLASIA. E. COLON-BIOPSY, 40 CM COLONIC MUCOSA WITH NO CRYPT DISTORTION, DISARRAY, OR SIGNIFICANT ACTIVE INFLAMMATION.   NEGATIVE FOR GRANULOMAS AND DYSPLASIA. F. COLON-BIOPSY, 30 CM COLONIC MUCOSA WITH NO CRYPT DISTORTION, DISARRAY, OR SIGNIFICANT ACTIVE INFLAMMATION.   NEGATIVE FOR GRANULOMAS AND DYSPLASIA. G. COLON-BIOPSY, 20 CM COLONIC MUCOSA WITH NO CRYPT DISTORTION, DISARRAY, OR SIGNIFICANT ACTIVE INFLAMMATION.   NEGATIVE FOR GRANULOMAS AND DYSPLASIA. H. RECTUM-BIOPSY CONSISTENT WITH ULCERATIVE COLITIS WITH MILD ACTIVE INFLAMMATION.  NEGATIVE FOR  GRANULOMAS AND DYSPLASIA. .

## 2022-11-30 PROBLEM — 442159003 CHRONIC ULCERATIVE PANCOLITIS: Status: ACTIVE | Noted: 2021-03-03

## 2022-12-09 LAB
A/G RATIO: 1.2
ABSOLUTE BASOPHILS: 30
ABSOLUTE EOSINOPHILS: 222
ABSOLUTE LYMPHOCYTES: 1491
ABSOLUTE MONOCYTES: 444
ABSOLUTE NEUTROPHILS: 1513
ALBUMIN: 3.9
ALKALINE PHOSPHATASE: 52
ALT (SGPT): 18
AST (SGOT): 20
BASOPHILS: 0.8
BILIRUBIN, TOTAL: 1
BUN/CREATININE RATIO: (no result)
BUN: 16
CALCIUM: 9.2
CARBON DIOXIDE, TOTAL: 30
CHLORIDE: 101
COMMENT: (no result)
CREATININE: 0.6
EGFR: 97
EOSINOPHILS: 6
GLOBULIN, TOTAL: 3.2
GLUCOSE: 79
HEMATOCRIT: 36.9
HEMOGLOBIN: 12.5
INFLIXIMAB AB, IBD: <10
INFLIXIMAB DRUG LEVEL: 35.6
INTERPRETATION: (no result)
LYMPHOCYTES: 40.3
MCH: 30
MCHC: 33.9
MCV: 88.5
MITOGEN-NIL: >10
MONOCYTES: 12
MPV: 10.5
NEUTROPHILS: 40.9
NIL: 0.08
PLATELET COUNT: 177
POTASSIUM: 4.2
PROTEIN, TOTAL: 7.1
QUANTIFERON(R)-TB GOLD: NEGATIVE
RDW: 12.8
RED BLOOD CELL COUNT: 4.17
SODIUM: 137
TB1-NIL: <0
TB2-NIL: <0
WHITE BLOOD CELL COUNT: 3.7

## 2022-12-27 ENCOUNTER — OFFICE VISIT (OUTPATIENT)
Dept: URBAN - METROPOLITAN AREA CLINIC 97 | Facility: CLINIC | Age: 67
End: 2022-12-27
Payer: MEDICARE

## 2022-12-27 VITALS
HEART RATE: 50 BPM | WEIGHT: 105 LBS | RESPIRATION RATE: 16 BRPM | BODY MASS INDEX: 17.93 KG/M2 | HEIGHT: 64 IN | TEMPERATURE: 98.4 F | SYSTOLIC BLOOD PRESSURE: 127 MMHG | DIASTOLIC BLOOD PRESSURE: 75 MMHG

## 2022-12-27 DIAGNOSIS — K50.80 CROHN'S COLITIS: ICD-10-CM

## 2022-12-27 PROCEDURE — 96365 THER/PROPH/DIAG IV INF INIT: CPT | Performed by: INTERNAL MEDICINE

## 2022-12-27 RX ORDER — INFLIXIMAB 100 MG/10ML
AS DIRECTED INJECTION, POWDER, LYOPHILIZED, FOR SOLUTION INTRAVENOUS
Qty: 100 | Refills: 0 | Status: ACTIVE | COMMUNITY
Start: 2022-11-29 | End: 2022-12-29

## 2023-02-02 ENCOUNTER — OFFICE VISIT (OUTPATIENT)
Dept: URBAN - METROPOLITAN AREA CLINIC 97 | Facility: CLINIC | Age: 68
End: 2023-02-02
Payer: MEDICARE

## 2023-02-02 VITALS
SYSTOLIC BLOOD PRESSURE: 120 MMHG | TEMPERATURE: 96 F | RESPIRATION RATE: 18 BRPM | HEART RATE: 64 BPM | WEIGHT: 105 LBS | HEIGHT: 64 IN | DIASTOLIC BLOOD PRESSURE: 63 MMHG | BODY MASS INDEX: 17.93 KG/M2

## 2023-02-02 DIAGNOSIS — K51.80 OTHER ULCERATIVE COLITIS: ICD-10-CM

## 2023-02-02 PROCEDURE — 96365 THER/PROPH/DIAG IV INF INIT: CPT | Performed by: INTERNAL MEDICINE

## 2023-02-06 PROBLEM — 64766004 ULCERATIVE COLITIS: Status: ACTIVE | Noted: 2023-02-06

## 2023-03-13 ENCOUNTER — OFFICE VISIT (OUTPATIENT)
Dept: URBAN - METROPOLITAN AREA CLINIC 97 | Facility: CLINIC | Age: 68
End: 2023-03-13
Payer: MEDICARE

## 2023-03-13 VITALS
BODY MASS INDEX: 17.93 KG/M2 | RESPIRATION RATE: 16 BRPM | HEART RATE: 60 BPM | WEIGHT: 105 LBS | TEMPERATURE: 97.7 F | DIASTOLIC BLOOD PRESSURE: 77 MMHG | SYSTOLIC BLOOD PRESSURE: 137 MMHG | HEIGHT: 64 IN

## 2023-03-13 DIAGNOSIS — K51.80 CHRONIC PANCOLONIC ULCERATIVE COLITIS: ICD-10-CM

## 2023-03-13 PROCEDURE — 96365 THER/PROPH/DIAG IV INF INIT: CPT | Performed by: INTERNAL MEDICINE

## 2023-04-24 ENCOUNTER — OFFICE VISIT (OUTPATIENT)
Dept: URBAN - METROPOLITAN AREA CLINIC 97 | Facility: CLINIC | Age: 68
End: 2023-04-24
Payer: MEDICARE

## 2023-04-24 VITALS
HEIGHT: 64 IN | BODY MASS INDEX: 18.27 KG/M2 | SYSTOLIC BLOOD PRESSURE: 127 MMHG | DIASTOLIC BLOOD PRESSURE: 84 MMHG | RESPIRATION RATE: 16 BRPM | HEART RATE: 62 BPM | WEIGHT: 107 LBS | TEMPERATURE: 97.3 F

## 2023-04-24 DIAGNOSIS — K51.80 CHRONIC PANCOLONIC ULCERATIVE COLITIS: ICD-10-CM

## 2023-04-24 PROCEDURE — 96365 THER/PROPH/DIAG IV INF INIT: CPT | Performed by: INTERNAL MEDICINE

## 2023-06-05 ENCOUNTER — OFFICE VISIT (OUTPATIENT)
Dept: URBAN - METROPOLITAN AREA CLINIC 97 | Facility: CLINIC | Age: 68
End: 2023-06-05
Payer: MEDICARE

## 2023-06-05 VITALS
BODY MASS INDEX: 17.93 KG/M2 | HEART RATE: 62 BPM | HEIGHT: 64 IN | WEIGHT: 105 LBS | RESPIRATION RATE: 18 BRPM | DIASTOLIC BLOOD PRESSURE: 79 MMHG | SYSTOLIC BLOOD PRESSURE: 118 MMHG | TEMPERATURE: 97.7 F

## 2023-06-05 DIAGNOSIS — K51.80 OTHER ULCERATIVE COLITIS: ICD-10-CM

## 2023-06-05 PROCEDURE — 96365 THER/PROPH/DIAG IV INF INIT: CPT | Performed by: INTERNAL MEDICINE

## 2023-06-09 ENCOUNTER — OFFICE VISIT (OUTPATIENT)
Dept: URBAN - METROPOLITAN AREA SURGERY CENTER 18 | Facility: SURGERY CENTER | Age: 68
End: 2023-06-09
Payer: MEDICARE

## 2023-06-09 ENCOUNTER — CLAIMS CREATED FROM THE CLAIM WINDOW (OUTPATIENT)
Dept: URBAN - METROPOLITAN AREA CLINIC 4 | Facility: CLINIC | Age: 68
End: 2023-06-09
Payer: MEDICARE

## 2023-06-09 DIAGNOSIS — K51.919 ULCERATIVE COLITIS, UNSPECIFIED WITH UNSPECIFIED COMPLICATIONS: ICD-10-CM

## 2023-06-09 DIAGNOSIS — K63.89 OTHER SPECIFIED DISEASES OF INTESTINE: ICD-10-CM

## 2023-06-09 DIAGNOSIS — K51.00 ACUTE ULCERATIVE PANCOLITIS: ICD-10-CM

## 2023-06-09 PROCEDURE — 88342 IMHCHEM/IMCYTCHM 1ST ANTB: CPT | Performed by: PATHOLOGY

## 2023-06-09 PROCEDURE — 45380 COLONOSCOPY AND BIOPSY: CPT | Performed by: INTERNAL MEDICINE

## 2023-06-09 PROCEDURE — 88305 TISSUE EXAM BY PATHOLOGIST: CPT | Performed by: PATHOLOGY

## 2023-06-09 PROCEDURE — G8907 PT DOC NO EVENTS ON DISCHARG: HCPCS | Performed by: INTERNAL MEDICINE

## 2023-07-11 ENCOUNTER — OFFICE VISIT (OUTPATIENT)
Dept: URBAN - METROPOLITAN AREA CLINIC 97 | Facility: CLINIC | Age: 68
End: 2023-07-11
Payer: MEDICARE

## 2023-07-11 VITALS
RESPIRATION RATE: 18 BRPM | WEIGHT: 106 LBS | TEMPERATURE: 97.7 F | HEIGHT: 64 IN | DIASTOLIC BLOOD PRESSURE: 81 MMHG | BODY MASS INDEX: 18.1 KG/M2 | HEART RATE: 51 BPM | SYSTOLIC BLOOD PRESSURE: 136 MMHG

## 2023-07-11 DIAGNOSIS — K51.80 OTHER ULCERATIVE COLITIS: ICD-10-CM

## 2023-07-11 PROCEDURE — 96365 THER/PROPH/DIAG IV INF INIT: CPT | Performed by: INTERNAL MEDICINE

## 2023-08-21 ENCOUNTER — OFFICE VISIT (OUTPATIENT)
Dept: URBAN - METROPOLITAN AREA CLINIC 97 | Facility: CLINIC | Age: 68
End: 2023-08-21
Payer: MEDICARE

## 2023-08-21 VITALS
WEIGHT: 105 LBS | BODY MASS INDEX: 17.93 KG/M2 | RESPIRATION RATE: 18 BRPM | DIASTOLIC BLOOD PRESSURE: 69 MMHG | HEIGHT: 64 IN | HEART RATE: 51 BPM | SYSTOLIC BLOOD PRESSURE: 120 MMHG | TEMPERATURE: 97.7 F

## 2023-08-21 DIAGNOSIS — K51.80 OTHER ULCERATIVE COLITIS: ICD-10-CM

## 2023-08-21 PROCEDURE — 96413 CHEMO IV INFUSION 1 HR: CPT | Performed by: INTERNAL MEDICINE

## 2023-10-02 ENCOUNTER — OFFICE VISIT (OUTPATIENT)
Dept: URBAN - METROPOLITAN AREA CLINIC 97 | Facility: CLINIC | Age: 68
End: 2023-10-02
Payer: MEDICARE

## 2023-10-02 VITALS
BODY MASS INDEX: 17.93 KG/M2 | HEART RATE: 52 BPM | TEMPERATURE: 97.7 F | SYSTOLIC BLOOD PRESSURE: 122 MMHG | WEIGHT: 105 LBS | DIASTOLIC BLOOD PRESSURE: 81 MMHG | RESPIRATION RATE: 18 BRPM | HEIGHT: 64 IN

## 2023-10-02 DIAGNOSIS — K51.80 OTHER ULCERATIVE COLITIS: ICD-10-CM

## 2023-10-02 PROCEDURE — 96413 CHEMO IV INFUSION 1 HR: CPT | Performed by: INTERNAL MEDICINE

## 2023-11-13 ENCOUNTER — TELEPHONE ENCOUNTER (OUTPATIENT)
Dept: URBAN - METROPOLITAN AREA CLINIC 97 | Facility: CLINIC | Age: 68
End: 2023-11-13

## 2023-11-13 ENCOUNTER — OFFICE VISIT (OUTPATIENT)
Dept: URBAN - METROPOLITAN AREA CLINIC 97 | Facility: CLINIC | Age: 68
End: 2023-11-13
Payer: MEDICARE

## 2023-11-13 VITALS
HEART RATE: 57 BPM | RESPIRATION RATE: 18 BRPM | BODY MASS INDEX: 18.1 KG/M2 | SYSTOLIC BLOOD PRESSURE: 104 MMHG | WEIGHT: 106 LBS | DIASTOLIC BLOOD PRESSURE: 77 MMHG | HEIGHT: 64 IN | TEMPERATURE: 97.5 F

## 2023-11-13 DIAGNOSIS — K51.80 OTHER ULCERATIVE COLITIS: ICD-10-CM

## 2023-11-13 PROCEDURE — 96413 CHEMO IV INFUSION 1 HR: CPT | Performed by: INTERNAL MEDICINE

## 2023-11-13 RX ORDER — INFLIXIMAB 100 MG/10ML
AS DIRECTED INJECTION, POWDER, LYOPHILIZED, FOR SOLUTION INTRAVENOUS
OUTPATIENT
Start: 2023-11-13

## 2023-12-21 ENCOUNTER — OFFICE VISIT (OUTPATIENT)
Dept: URBAN - METROPOLITAN AREA CLINIC 97 | Facility: CLINIC | Age: 68
End: 2023-12-21

## 2023-12-21 ENCOUNTER — OFFICE VISIT (OUTPATIENT)
Dept: URBAN - METROPOLITAN AREA CLINIC 117 | Facility: CLINIC | Age: 68
End: 2023-12-21
Payer: MEDICARE

## 2023-12-21 VITALS
RESPIRATION RATE: 18 BRPM | SYSTOLIC BLOOD PRESSURE: 118 MMHG | WEIGHT: 106.2 LBS | HEIGHT: 64 IN | DIASTOLIC BLOOD PRESSURE: 81 MMHG | BODY MASS INDEX: 18.13 KG/M2 | TEMPERATURE: 97.2 F | HEART RATE: 57 BPM

## 2023-12-21 DIAGNOSIS — K51.80 CHRONIC PANCOLONIC ULCERATIVE COLITIS: ICD-10-CM

## 2023-12-21 PROCEDURE — 96413 CHEMO IV INFUSION 1 HR: CPT | Performed by: INTERNAL MEDICINE

## 2023-12-21 RX ORDER — INFLIXIMAB 100 MG/10ML
AS DIRECTED INJECTION, POWDER, LYOPHILIZED, FOR SOLUTION INTRAVENOUS
Status: ACTIVE | COMMUNITY
Start: 2023-11-13

## 2024-01-05 ENCOUNTER — DASHBOARD ENCOUNTERS (OUTPATIENT)
Age: 69
End: 2024-01-05

## 2024-01-05 ENCOUNTER — OFFICE VISIT (OUTPATIENT)
Dept: URBAN - METROPOLITAN AREA TELEHEALTH 2 | Facility: TELEHEALTH | Age: 69
End: 2024-01-05
Payer: MEDICARE

## 2024-01-05 ENCOUNTER — LAB OUTSIDE AN ENCOUNTER (OUTPATIENT)
Dept: URBAN - METROPOLITAN AREA TELEHEALTH 2 | Facility: TELEHEALTH | Age: 69
End: 2024-01-05

## 2024-01-05 ENCOUNTER — TELEPHONE ENCOUNTER (OUTPATIENT)
Dept: URBAN - METROPOLITAN AREA CLINIC 96 | Facility: CLINIC | Age: 69
End: 2024-01-05

## 2024-01-05 DIAGNOSIS — Z79.899 LONG-TERM USE OF IMMUNOSUPPRESSANT MEDICATION: ICD-10-CM

## 2024-01-05 DIAGNOSIS — R19.7 DIARRHEA: ICD-10-CM

## 2024-01-05 DIAGNOSIS — Z91.89 COLON CANCER HIGH RISK: ICD-10-CM

## 2024-01-05 DIAGNOSIS — K51.00 ULCERATIVE COLITIS, UNIVERSAL, WITHOUT COMPLICATIONS: ICD-10-CM

## 2024-01-05 PROCEDURE — 99214 OFFICE O/P EST MOD 30 MIN: CPT | Performed by: INTERNAL MEDICINE

## 2024-01-05 RX ORDER — ONDANSETRON HYDROCHLORIDE 4 MG/1
1 TABLET TABLET, FILM COATED ORAL EVERY 4 HOURS PRN
Qty: 3 | Refills: 0 | OUTPATIENT
Start: 2024-01-05

## 2024-01-05 RX ORDER — INFLIXIMAB 100 MG/10ML
AS DIRECTED INJECTION, POWDER, LYOPHILIZED, FOR SOLUTION INTRAVENOUS
Status: ACTIVE | COMMUNITY
Start: 2023-11-13

## 2024-01-05 RX ORDER — INFLIXIMAB 100 MG/10ML
AS DIRECTED INJECTION, POWDER, LYOPHILIZED, FOR SOLUTION INTRAVENOUS
OUTPATIENT
Start: 2024-01-05

## 2024-01-05 RX ORDER — SODIUM SULFATE, MAGNESIUM SULFATE, AND POTASSIUM CHLORIDE 17.75; 2.7; 2.25 G/1; G/1; G/1
AS DIRECTED TABLET ORAL ONCE
Qty: 1 | Refills: 0 | OUTPATIENT
Start: 2024-01-05 | End: 2024-01-06

## 2024-01-05 NOTE — HPI-TODAY'S VISIT:
Pt Eloy is a 69 y/o with h/o pan-UC, currently on Remicade (every 6 weeks 7.5mg/kg) here for refractory dz. . Pt is referred by Dr. Morton. . She was diagnosed with UC around 34 years of age.  She had severe inflammation and diarrhea/bleeding.  Was given steroids.  She started Remicade in 2011, which has worked very well for her. She has used suppositories (had reactive diarrhea with Canasa) and oral agents. . Previously on 11/11/2019, pt reports that she has up 1-2 bm per day, no blood in the  stool, no abdominal pain.  No nausea or vomiting. Stress does exacerbate her sxs.  We started on Colazol . Previously on 12/10/2019, pt reports that she started to experience worsening diarrhea after starting the Colazol.  She got a sooner dose of Remicade. She stopped the Colazol and diarrhea improved.  However, she seems to have breakthrough sxs at 5-6 weeks.  She is prednsione 20mg. . Previously on 7/8/2020, pt reports that she had pneumonia (first time) on December 26, 2019.  She has 4-5 BM per day.  No blood in the stool.  She sees breakthrough of sxs about 2 week prior to her next infusion. Previously on 5/5/2021, pt reports that she continues to have good and bad days with her UC.  She seems to have breakthrough sxs Previously on 9/8/2021, pt reports that she has no blood and diarrhea; she reports that  Previously on 1/12/2022, pt reports that she is having a lot of diarrhea, waking up at night for BM.  She is having bowel incontinence as well. (trigger was alcohol and coffee) Previously on 11/29/2022, pt reports that she has been doing very well, overate on Thanksgiving.  Remicade is doing very well, no rectal bleeding, no urgency. . Today on 1/5/2024, she notices slight flare about 1 week prior to the start of the infusion; however, does not want to increase the dose.  No joint pain or rashes. . Labs: 7/2020: IFX trough level 7.2 and AB neg 11/2019: IFX 13 (AB 36) every 7 week at 5mg/kg  2 week trough level, quant-gold indeterminate, CRP 7, ESR 11 11/2018: Hgb 13.1, ast 21, alt 19; hep B neg; B12 558 . SH: no smoke/etoh; some MJ use FH: no IBD . Colonoscopy: 6/2023: The ascending colon appeared normal. Biopsies were taken with a cold forceps for histology. The pathology specimenwas placed into Bottle Number 1.The transverse colon appeared normal. Biopsies were taken with a cold forceps for histology. The pathology specimenwas placed into Bottle Number 2.The descending colon appeared normal. Biopsies were taken with a cold forceps for histology. The pathologyspecimen was placed into Bottle Number 3.A patchy area of mildly erythematous mucosa was found in the sigmoid colon. This was biopsied with a cold forceps forhistology. The pathology specimen was placed into Bottle Number 4.Diffuse mild inflammation characterized by erosions, friability and shallow ulcerations was found in the rectum. Biopsieswere taken with a cold forceps for histology. The pathology specimen was placed into Bottle Number 5.Overall stable control of disease state. . (A) Colon, Ascending, Biopsy:NO SIGNIFICANT ABNORMALITY.(B) Colon, Transverse, Biopsy:NO SIGNIFICANT ABNORMALITY.(C) Colon, Descending, Biopsy:NO SIGNIFICANT ABNORMALITY.(D) Colon, Sigmoid, Biopsy:NO SIGNIFICANT ABNORMALITY.(E) Rectum, Biopsy:DIFFUSE CHRONIC ACTIVE COLITIS, CONSISTENT WITH ULCERATIVE COLITIS. SeeComment.Negative for Infectious Organisms, Dysplasia or Malignancy.COMMENT: The biopsies show a diffuse and uniform chronic active colitis with a universal involvementof the rectum and absence of granulomas, supporting a diagnosis of ulcerative colitis. The presence ofhistological features of chronicity excludes acute infectious colitis. . 8/2021: The ascending colon appeared normal. Biopsies were taken with a cold forceps for histology. The pathology specimen was placed into Bottle Number 1. Findings: The transverse colon appeared normal. Biopsies were taken with a cold forceps for histology. The pathology specimen was placed into Bottle Number 2. The descending colon appeared normal. Biopsies were taken with a cold forceps for histology. The pathology specimen was placed into Bottle Number 3. A diffuse area of mildly erythematous mucosa was found in the sigmoid colon. This was biopsied with a cold forceps for histology. The pathology specimen was placed into Bottle Number 4. A continuous area of nonbleeding ulcerated mucosa with no stigmata of recent bleeding was present in the rectum and in the recto-sigmoid colon. Biopsies were taken with a cold forceps for histology. The pathology specimen was placed into Bottle Number 5 . (A) Colon, Ascending, Biopsy: NO SIGNIFICANT ABNORMALITY. (B) Colon, Transverse, Biopsy: NO SIGNIFICANT ABNORMALITY. (C) Colon, Descending, Biopsy: NO SIGNIFICANT ABNORMALITY. (D) Colon, Sigmoid, Biopsy: NO SIGNIFICANT ABNORMALITY. (E) Rectum, Biopsy: DIFFUSE CHRONIC ACTIVE PROCTITIS, CONSISTENT WITH ULCERATIVE COLITIS. See Comment. Negative for Infectious Organisms, Dysplasia or Malignancy. COMMENT: Ulcerative colitis that is limited to the rectum has been referred to as ulcerative proctitis. This disease is thought to be less severe and to have a lower rate of dysplasia and adenocarcinoma than cases of ulcerative pan-colitis. MICROSCOPIC DESCRIPT . 1/2019: A. Cecum , Biopsy: -Focal active colitis (see comment). -Negative for dysplasia or malignancy. B. Colon 70cm , Biopsy: -Colonic mucosa with no diagnostic abnormality.  -No evidence of chronic or active colitis.  -No granulomas seen.  -Negative for dysplasia or malignancy. C. Ascending Colon Polyp, Polypectomy: -Sessile serrated polyp. D. Colon 60cm , Biopsy: -Colonic mucosa with no diagnostic abnormality.  -No evidence of chronic or active colitis.  -No granulomas seen.  -Negative for dysplasia or malignancy. E. Colon 50cm , Biopsy: -Colonic mucosa with no diagnostic abnormality.  -No evidence of chronic or active colitis.  -No granulomas seen.  -Negative for dysplasia or malignancy. F. Colon 40cm , Biopsy: -Colonic mucosa with no diagnostic abnormality.  -No evidence of chronic or active colitis.  -No granulomas seen.  -Negative for dysplasia or malignancy. G. Colon 30cm , Biopsy: -Colonic mucosa with no diagnostic abnormality.  -No evidence of chronic or active colitis.  -No granulomas seen.  -Negative for dysplasia or malignancy. H. Colon 20cm , Biopsy: -Chronic active colitis, minimal, consistent with history of Ulcerative Colitis. -No granulomas, negative for dysplasia or malignancy. I . Rectum , Biopsy: -Chronic active colitis, mild, consistent with history of Ulcerative Colitis. -No granulomas, negative for dysplasia or malignancy. . 2017: A. CECUM (BIOPSY):     COLONIC MUCOSA WITH NO SIGNIFICANT HISTOPATHOLOGY.     NO HISTOLOGIC EVIDENCE OF ACTIVE, CHRONIC OR MICROSCOPIC COLITIS.  NO GRANULOMATA OR DYSPLASIA IDENTIFIED.    B. COLON, 70 CM (BIOPSY):     COLONIC MUCOSA WITH NO SIGNIFICANT HISTOPATHOLOGY.     NO HISTOLOGIC EVIDENCE OF ACTIVE, CHRONIC OR MICROSCOPIC COLITIS.  NO GRANULOMATA OR DYSPLASIA IDENTIFIED.      C. COLON, 60 CM (BIOPSY):      COLONIC MUCOSA WITH NO SIGNIFICANT HISTOPATHOLOGY.     NO HISTOLOGIC EVIDENCE OF ACTIVE, CHRONIC OR MICROSCOPIC COLITIS.  NO GRANULOMATA OR DYSPLASIA IDENTIFIED.      D. COLON, 50 CM (BIOPSY):     COLONIC MUCOSA WITH NO SIGNIFICANT HISTOPATHOLOGY.     NO HISTOLOGIC EVIDENCE OF ACTIVE, CHRONIC OR MICROSCOPIC COLITIS.  NO GRANULOMATA OR DYSPLASIA IDENTIFIED.      E. COLON, 40 CM (BIOPSY):     CHRONIC INACTIVE COLITIS.  NO GRANULOMATA OR DYSPLASIA IDENTIFIED.    F. COLON, 30 CM (BIOPSY):     CHRONIC INACTIVE COLITIS.  NO GRANULOMATA OR DYSPLASIA IDENTIFIED.      G. COLON, 20 CM   (BIOPSY):     CHRONIC INACTIVE COLITIS.  NO GRANULOMATA OR DYSPLASIA IDENTIFIED.      H. RECTUM, 10 CM (BIOPSY):     CHRONIC MILDLY ACTIVE COLITIS/PROCTITIS.  NO GRANULOMATA OR DYSPLASIA IDENTIFIED.  . 2016: A. CECUM-BIOPSY COLONIC MUCOSA WITH NO CRYPT DISTORTION, DISARRAY, OR SIGNIFICANT ACTIVE INFLAMMATION.   NEGATIVE FOR GRANULOMAS AND DYSPLASIA. B. COLON-BIOPSY, 70 CM COLONIC MUCOSA WITH NO CRYPT DISTORTION, DISARRAY, OR SIGNIFICANT ACTIVE INFLAMMATION.   NEGATIVE FOR GRANULOMAS AND DYSPLASIA. C. COLON-BIOPSY, 60 CM COLONIC MUCOSA WITH NO CRYPT DISTORTION, DISARRAY, OR SIGNIFICANT ACTIVE INFLAMMATION.   NEGATIVE FOR GRANULOMAS AND DYSPLASIA. D. COLON-BIOPSY, 50 CM COLONIC MUCOSA WITH NO CRYPT DISTORTION, DISARRAY, OR SIGNIFICANT ACTIVE INFLAMMATION.   NEGATIVE FOR GRANULOMAS AND DYSPLASIA. E. COLON-BIOPSY, 40 CM COLONIC MUCOSA WITH NO CRYPT DISTORTION, DISARRAY, OR SIGNIFICANT ACTIVE INFLAMMATION.   NEGATIVE FOR GRANULOMAS AND DYSPLASIA. F. COLON-BIOPSY, 30 CM COLONIC MUCOSA WITH NO CRYPT DISTORTION, DISARRAY, OR SIGNIFICANT ACTIVE INFLAMMATION.   NEGATIVE FOR GRANULOMAS AND DYSPLASIA. G. COLON-BIOPSY, 20 CM COLONIC MUCOSA WITH NO CRYPT DISTORTION, DISARRAY, OR SIGNIFICANT ACTIVE INFLAMMATION.   NEGATIVE FOR GRANULOMAS AND DYSPLASIA. H. RECTUM-BIOPSY CONSISTENT WITH ULCERATIVE COLITIS WITH MILD ACTIVE INFLAMMATION.  NEGATIVE FOR  GRANULOMAS AND DYSPLASIA. INCREASED LYMPHOPLASMACYTIC INFILTRATION, ACTIVE CRYPT INJURY, OR GRANULOMAS,  CONSISTENT WITH QUIESCENT INFLAMMATORY BOWEL DISEASE. H. COLON (BIOPSY), @ 10 CM ACTIVE CHRONIC COLITIS CONSISTENT MILDLY ACTIVE WITH INFLAMMATORY BOWEL DISEASE. NEGATIVE FOR GRANULOMAS AND DYSPLASIA. . 6/2015: A. CECUM-BIOPSY COLONIC MUCOSA WITH NO CRYPT DISTORTION, DISARRAY, OR SIGNIFICANT ACTIVE INFLAMMATION.   NEGATIVE FOR GRANULOMAS AND DYSPLASIA. B. COLON-BIOPSY, 70 CM COLONIC MUCOSA WITH NO CRYPT DISTORTION, DISARRAY, OR SIGNIFICANT ACTIVE INFLAMMATION.   NEGATIVE FOR GRANULOMAS AND DYSPLASIA. C. COLON-BIOPSY, 60 CM COLONIC MUCOSA WITH NO CRYPT DISTORTION, DISARRAY, OR SIGNIFICANT ACTIVE INFLAMMATION.   NEGATIVE FOR GRANULOMAS AND DYSPLASIA. D. COLON-BIOPSY, 50 CM COLONIC MUCOSA WITH NO CRYPT DISTORTION, DISARRAY, OR SIGNIFICANT ACTIVE INFLAMMATION.   NEGATIVE FOR GRANULOMAS AND DYSPLASIA. E. COLON-BIOPSY, 40 CM COLONIC MUCOSA WITH NO CRYPT DISTORTION, DISARRAY, OR SIGNIFICANT ACTIVE INFLAMMATION.   NEGATIVE FOR GRANULOMAS AND DYSPLASIA. F. COLON-BIOPSY, 30 CM COLONIC MUCOSA WITH NO CRYPT DISTORTION, DISARRAY, OR SIGNIFICANT ACTIVE INFLAMMATION.   NEGATIVE FOR GRANULOMAS AND DYSPLASIA. G. COLON-BIOPSY, 20 CM COLONIC MUCOSA WITH NO CRYPT DISTORTION, DISARRAY, OR SIGNIFICANT ACTIVE INFLAMMATION.   NEGATIVE FOR GRANULOMAS AND DYSPLASIA. H. RECTUM-BIOPSY CONSISTENT WITH ULCERATIVE COLITIS WITH MILD ACTIVE INFLAMMATION.  NEGATIVE FOR  GRANULOMAS AND DYSPLASIA. .

## 2024-01-13 LAB
A/G RATIO: 1.3
ALBUMIN: 4.3
ALKALINE PHOSPHATASE: 42
ALT (SGPT): 18
ANTI-INFLIXIMAB ANTIBODY: <22
AST (SGOT): 21
BASO (ABSOLUTE): 0.1
BASOS: 1
BILIRUBIN, TOTAL: 1
BUN/CREATININE RATIO: 26
BUN: 16
CALCIUM: 9.6
CARBON DIOXIDE, TOTAL: 25
CHLORIDE: 100
CREATININE: 0.61
EGFR: 96
EOS (ABSOLUTE): 0.3
EOS: 6
GLOBULIN, TOTAL: 3.4
GLUCOSE: 95
HEMATOCRIT: 39.2
HEMATOLOGY COMMENTS:: (no result)
HEMOGLOBIN: 12.7
IMMATURE CELLS: (no result)
IMMATURE GRANS (ABS): 0
IMMATURE GRANULOCYTES: 0
INFLIXIMAB DRUG LEVEL: 24
LYMPHS (ABSOLUTE): 2
LYMPHS: 47
Lab: (no result)
MCH: 30.4
MCHC: 32.4
MCV: 94
MONOCYTES(ABSOLUTE): 0.4
MONOCYTES: 8
NEUTROPHILS (ABSOLUTE): 1.6
NEUTROPHILS: 38
NRBC: (no result)
PLATELETS: 225
POTASSIUM: 4.5
PROTEIN, TOTAL: 7.7
QUANTIFERON CRITERIA: (no result)
QUANTIFERON INCUBATION: (no result)
QUANTIFERON MITOGEN VALUE: >10
QUANTIFERON NIL VALUE: 0
QUANTIFERON TB1 AG VALUE: 0.02
QUANTIFERON TB2 AG VALUE: 0.07
QUANTIFERON-TB GOLD PLUS: NEGATIVE
RBC: 4.18
RDW: 13.1
SODIUM: 138
WBC: 4.3

## 2024-01-26 ENCOUNTER — OFFICE VISIT (OUTPATIENT)
Dept: URBAN - METROPOLITAN AREA CLINIC 97 | Facility: CLINIC | Age: 69
End: 2024-01-26
Payer: MEDICARE

## 2024-01-26 VITALS
BODY MASS INDEX: 18.27 KG/M2 | SYSTOLIC BLOOD PRESSURE: 122 MMHG | HEART RATE: 76 BPM | TEMPERATURE: 97.9 F | RESPIRATION RATE: 18 BRPM | WEIGHT: 107 LBS | DIASTOLIC BLOOD PRESSURE: 80 MMHG | HEIGHT: 64 IN

## 2024-01-26 DIAGNOSIS — K51.80 CHRONIC PANCOLONIC ULCERATIVE COLITIS: ICD-10-CM

## 2024-01-26 PROCEDURE — 96413 CHEMO IV INFUSION 1 HR: CPT | Performed by: INTERNAL MEDICINE

## 2024-01-26 RX ORDER — INFLIXIMAB 100 MG/10ML
AS DIRECTED INJECTION, POWDER, LYOPHILIZED, FOR SOLUTION INTRAVENOUS
Status: ACTIVE | COMMUNITY
Start: 2023-11-13

## 2024-01-26 RX ORDER — ONDANSETRON HYDROCHLORIDE 4 MG/1
1 TABLET TABLET, FILM COATED ORAL EVERY 4 HOURS PRN
Qty: 3 | Refills: 0 | Status: ACTIVE | COMMUNITY
Start: 2024-01-05

## 2024-01-26 RX ORDER — INFLIXIMAB 100 MG/10ML
AS DIRECTED INJECTION, POWDER, LYOPHILIZED, FOR SOLUTION INTRAVENOUS
Status: ACTIVE | COMMUNITY
Start: 2024-01-05

## 2024-03-04 ENCOUNTER — REM (OUTPATIENT)
Dept: URBAN - METROPOLITAN AREA CLINIC 97 | Facility: CLINIC | Age: 69
End: 2024-03-04

## 2024-03-07 ENCOUNTER — REM (OUTPATIENT)
Dept: URBAN - METROPOLITAN AREA CLINIC 97 | Facility: CLINIC | Age: 69
End: 2024-03-07
Payer: MEDICARE

## 2024-03-07 VITALS
WEIGHT: 105 LBS | HEART RATE: 75 BPM | RESPIRATION RATE: 18 BRPM | SYSTOLIC BLOOD PRESSURE: 127 MMHG | BODY MASS INDEX: 17.93 KG/M2 | DIASTOLIC BLOOD PRESSURE: 84 MMHG | HEIGHT: 64 IN | TEMPERATURE: 97.6 F

## 2024-03-07 DIAGNOSIS — K51.80 CHRONIC PANCOLONIC ULCERATIVE COLITIS: ICD-10-CM

## 2024-03-07 PROCEDURE — 96413 CHEMO IV INFUSION 1 HR: CPT | Performed by: INTERNAL MEDICINE

## 2024-03-07 RX ORDER — ONDANSETRON HYDROCHLORIDE 4 MG/1
1 TABLET TABLET, FILM COATED ORAL EVERY 4 HOURS PRN
Qty: 3 | Refills: 0 | Status: ACTIVE | COMMUNITY
Start: 2024-01-05

## 2024-03-07 RX ORDER — INFLIXIMAB 100 MG/10ML
AS DIRECTED INJECTION, POWDER, LYOPHILIZED, FOR SOLUTION INTRAVENOUS
Status: ACTIVE | COMMUNITY
Start: 2023-11-13

## 2024-03-07 RX ORDER — INFLIXIMAB 100 MG/10ML
AS DIRECTED INJECTION, POWDER, LYOPHILIZED, FOR SOLUTION INTRAVENOUS
Status: ACTIVE | COMMUNITY
Start: 2024-01-05

## 2024-04-15 ENCOUNTER — REM (OUTPATIENT)
Dept: URBAN - METROPOLITAN AREA CLINIC 97 | Facility: CLINIC | Age: 69
End: 2024-04-15
Payer: MEDICARE

## 2024-04-15 VITALS
HEIGHT: 64 IN | WEIGHT: 105 LBS | RESPIRATION RATE: 18 BRPM | BODY MASS INDEX: 17.93 KG/M2 | TEMPERATURE: 96.8 F | SYSTOLIC BLOOD PRESSURE: 105 MMHG | DIASTOLIC BLOOD PRESSURE: 73 MMHG | HEART RATE: 69 BPM

## 2024-04-15 DIAGNOSIS — K51.80 CHRONIC PANCOLONIC ULCERATIVE COLITIS: ICD-10-CM

## 2024-04-15 PROCEDURE — 96413 CHEMO IV INFUSION 1 HR: CPT | Performed by: INTERNAL MEDICINE

## 2024-04-15 RX ORDER — INFLIXIMAB 100 MG/10ML
AS DIRECTED INJECTION, POWDER, LYOPHILIZED, FOR SOLUTION INTRAVENOUS
Status: ACTIVE | COMMUNITY
Start: 2023-11-13

## 2024-04-15 RX ORDER — ONDANSETRON HYDROCHLORIDE 4 MG/1
1 TABLET TABLET, FILM COATED ORAL EVERY 4 HOURS PRN
Qty: 3 | Refills: 0 | Status: ACTIVE | COMMUNITY
Start: 2024-01-05

## 2024-04-15 RX ORDER — INFLIXIMAB 100 MG/10ML
AS DIRECTED INJECTION, POWDER, LYOPHILIZED, FOR SOLUTION INTRAVENOUS
Status: ACTIVE | COMMUNITY
Start: 2024-01-05

## 2024-05-15 ENCOUNTER — TELEPHONE ENCOUNTER (OUTPATIENT)
Dept: URBAN - METROPOLITAN AREA CLINIC 96 | Facility: CLINIC | Age: 69
End: 2024-05-15

## 2024-05-15 RX ORDER — SODIUM SULFATE, MAGNESIUM SULFATE, AND POTASSIUM CHLORIDE 17.75; 2.7; 2.25 G/1; G/1; G/1
AS DIRECTED TABLET ORAL ONCE
Qty: 1 | Refills: 0 | OUTPATIENT
Start: 2024-05-16 | End: 2024-05-17

## 2024-05-15 RX ORDER — ONDANSETRON HYDROCHLORIDE 4 MG/1
1 TABLET TABLET, FILM COATED ORAL EVERY 4 HOURS PRN
Qty: 3 | Refills: 0 | OUTPATIENT
Start: 2024-05-16

## 2024-05-24 ENCOUNTER — OFFICE VISIT (OUTPATIENT)
Dept: URBAN - METROPOLITAN AREA CLINIC 97 | Facility: CLINIC | Age: 69
End: 2024-05-24
Payer: MEDICARE

## 2024-05-24 VITALS
HEART RATE: 68 BPM | BODY MASS INDEX: 18.1 KG/M2 | WEIGHT: 106 LBS | SYSTOLIC BLOOD PRESSURE: 105 MMHG | DIASTOLIC BLOOD PRESSURE: 72 MMHG | HEIGHT: 64 IN | RESPIRATION RATE: 18 BRPM | TEMPERATURE: 96.8 F

## 2024-05-24 DIAGNOSIS — K51.80 OTHER ULCERATIVE COLITIS: ICD-10-CM

## 2024-05-24 PROCEDURE — 96413 CHEMO IV INFUSION 1 HR: CPT | Performed by: INTERNAL MEDICINE

## 2024-05-24 RX ORDER — ONDANSETRON HYDROCHLORIDE 4 MG/1
1 TABLET TABLET, FILM COATED ORAL EVERY 4 HOURS PRN
Qty: 3 | Refills: 0 | Status: ACTIVE | COMMUNITY
Start: 2024-01-05

## 2024-05-24 RX ORDER — INFLIXIMAB 100 MG/10ML
AS DIRECTED INJECTION, POWDER, LYOPHILIZED, FOR SOLUTION INTRAVENOUS
Status: ACTIVE | COMMUNITY
Start: 2024-01-05

## 2024-05-24 RX ORDER — ONDANSETRON HYDROCHLORIDE 4 MG/1
1 TABLET TABLET, FILM COATED ORAL EVERY 4 HOURS PRN
Qty: 3 | Refills: 0 | Status: ACTIVE | COMMUNITY
Start: 2024-05-16

## 2024-05-24 RX ORDER — INFLIXIMAB 100 MG/10ML
AS DIRECTED INJECTION, POWDER, LYOPHILIZED, FOR SOLUTION INTRAVENOUS
Status: ACTIVE | COMMUNITY
Start: 2023-11-13

## 2024-06-17 ENCOUNTER — TELEPHONE ENCOUNTER (OUTPATIENT)
Dept: URBAN - METROPOLITAN AREA CLINIC 96 | Facility: CLINIC | Age: 69
End: 2024-06-17

## 2024-06-20 ENCOUNTER — CLAIMS CREATED FROM THE CLAIM WINDOW (OUTPATIENT)
Dept: URBAN - METROPOLITAN AREA CLINIC 4 | Facility: CLINIC | Age: 69
End: 2024-06-20
Payer: MEDICARE

## 2024-06-20 ENCOUNTER — CLAIMS CREATED FROM THE CLAIM WINDOW (OUTPATIENT)
Dept: URBAN - METROPOLITAN AREA SURGERY CENTER 18 | Facility: SURGERY CENTER | Age: 69
End: 2024-06-20
Payer: MEDICARE

## 2024-06-20 DIAGNOSIS — K51.90 ULCERATIVE COLITIS: ICD-10-CM

## 2024-06-20 DIAGNOSIS — K62.89 RECTAL INFLAMMATION: ICD-10-CM

## 2024-06-20 DIAGNOSIS — K63.89 OTHER SPECIFIED DISEASES OF INTESTINE: ICD-10-CM

## 2024-06-20 DIAGNOSIS — Z12.11 COLON CANCER SCREENING (HIGH RISK): ICD-10-CM

## 2024-06-20 DIAGNOSIS — K51.30 ACUTE ULCERATIVE RECTOSIGMOIDITIS: ICD-10-CM

## 2024-06-20 PROCEDURE — 45380 COLONOSCOPY AND BIOPSY: CPT | Performed by: INTERNAL MEDICINE

## 2024-06-20 PROCEDURE — 00811 ANES LWR INTST NDSC NOS: CPT | Performed by: NURSE ANESTHETIST, CERTIFIED REGISTERED

## 2024-06-20 PROCEDURE — 88305 TISSUE EXAM BY PATHOLOGIST: CPT | Performed by: PATHOLOGY

## 2024-06-20 RX ORDER — INFLIXIMAB 100 MG/10ML
AS DIRECTED INJECTION, POWDER, LYOPHILIZED, FOR SOLUTION INTRAVENOUS
Status: ACTIVE | COMMUNITY
Start: 2024-01-05

## 2024-06-20 RX ORDER — ONDANSETRON HYDROCHLORIDE 4 MG/1
1 TABLET TABLET, FILM COATED ORAL EVERY 4 HOURS PRN
Qty: 3 | Refills: 0 | Status: ACTIVE | COMMUNITY
Start: 2024-05-16

## 2024-06-20 RX ORDER — INFLIXIMAB 100 MG/10ML
AS DIRECTED INJECTION, POWDER, LYOPHILIZED, FOR SOLUTION INTRAVENOUS
Status: ACTIVE | COMMUNITY
Start: 2023-11-13

## 2024-06-20 RX ORDER — ONDANSETRON HYDROCHLORIDE 4 MG/1
1 TABLET TABLET, FILM COATED ORAL EVERY 4 HOURS PRN
Qty: 3 | Refills: 0 | Status: ACTIVE | COMMUNITY
Start: 2024-01-05

## 2024-06-21 ENCOUNTER — TELEPHONE ENCOUNTER (OUTPATIENT)
Dept: URBAN - METROPOLITAN AREA CLINIC 96 | Facility: CLINIC | Age: 69
End: 2024-06-21

## 2024-07-03 ENCOUNTER — OFFICE VISIT (OUTPATIENT)
Dept: URBAN - METROPOLITAN AREA CLINIC 96 | Facility: CLINIC | Age: 69
End: 2024-07-03
Payer: MEDICARE

## 2024-07-03 VITALS
DIASTOLIC BLOOD PRESSURE: 78 MMHG | HEIGHT: 64 IN | TEMPERATURE: 97.2 F | BODY MASS INDEX: 17.48 KG/M2 | SYSTOLIC BLOOD PRESSURE: 130 MMHG | HEART RATE: 54 BPM | WEIGHT: 102.4 LBS

## 2024-07-03 DIAGNOSIS — Z79.899 LONG-TERM USE OF IMMUNOSUPPRESSANT MEDICATION: ICD-10-CM

## 2024-07-03 DIAGNOSIS — Z91.89 COLON CANCER HIGH RISK: ICD-10-CM

## 2024-07-03 DIAGNOSIS — Z09 FOLLOW UP: ICD-10-CM

## 2024-07-03 DIAGNOSIS — E55.9 VITAMIN D DEFICIENCY: ICD-10-CM

## 2024-07-03 DIAGNOSIS — K59.04 CHRONIC IDIOPATHIC CONSTIPATION: ICD-10-CM

## 2024-07-03 DIAGNOSIS — D51.1 VIT B12 DEFIC ANEMIA D/T SLCTV VIT B12 MALABSORP W PROTEIN: ICD-10-CM

## 2024-07-03 DIAGNOSIS — K51.00 ULCERATIVE COLITIS, UNIVERSAL, WITHOUT COMPLICATIONS: ICD-10-CM

## 2024-07-03 PROCEDURE — 99215 OFFICE O/P EST HI 40 MIN: CPT | Performed by: INTERNAL MEDICINE

## 2024-07-03 RX ORDER — INFLIXIMAB 100 MG/10ML
AS DIRECTED INJECTION, POWDER, LYOPHILIZED, FOR SOLUTION INTRAVENOUS
OUTPATIENT

## 2024-07-03 RX ORDER — DENOSUMAB 60 MG/ML
AS DIRECTED INJECTION SUBCUTANEOUS
Status: ACTIVE | COMMUNITY

## 2024-07-03 RX ORDER — PREDNISONE 10 MG/1
1 TABLET TABLET ORAL ONCE A DAY
Qty: 14 TABLET | Refills: 0 | OUTPATIENT
Start: 2024-07-03 | End: 2024-07-17

## 2024-07-03 RX ORDER — INFLIXIMAB 100 MG/10ML
AS DIRECTED INJECTION, POWDER, LYOPHILIZED, FOR SOLUTION INTRAVENOUS
Status: ACTIVE | COMMUNITY
Start: 2024-01-05

## 2024-07-03 RX ORDER — INFLIXIMAB 100 MG/10ML
AS DIRECTED INJECTION, POWDER, LYOPHILIZED, FOR SOLUTION INTRAVENOUS
Status: ACTIVE | COMMUNITY
Start: 2023-11-13

## 2024-07-03 RX ORDER — ONDANSETRON HYDROCHLORIDE 4 MG/1
1 TABLET TABLET, FILM COATED ORAL EVERY 4 HOURS PRN
Qty: 3 | Refills: 0 | Status: ON HOLD | COMMUNITY
Start: 2024-01-05

## 2024-07-03 RX ORDER — ONDANSETRON HYDROCHLORIDE 4 MG/1
1 TABLET TABLET, FILM COATED ORAL EVERY 4 HOURS PRN
Qty: 3 | Refills: 0 | Status: ON HOLD | COMMUNITY
Start: 2024-05-16

## 2024-07-03 NOTE — HPI-TODAY'S VISIT:
Pt Eloy is a 67 y/o with h/o pan-UC, currently on Remicade (every 6 weeks 7.5mg/kg) here for refractory dz. . Pt is referred by Dr. Morton. . She was diagnosed with UC around 34 years of age.  She had severe inflammation and diarrhea/bleeding.  Was given steroids.  She started Remicade in 2011, which has worked very well for her. She has used suppositories (had reactive diarrhea with Canasa) and oral agents. . Previously on 11/11/2019, pt reports that she has up 1-2 bm per day, no blood in the  stool, no abdominal pain.  No nausea or vomiting. Stress does exacerbate her sxs.  We started on Colazol . Previously on 12/10/2019, pt reports that she started to experience worsening diarrhea after starting the Colazol.  She got a sooner dose of Remicade. She stopped the Colazol and diarrhea improved.  However, she seems to have breakthrough sxs at 5-6 weeks.  She is prednsione 20mg. . Previously on 7/8/2020, pt reports that she had pneumonia (first time) on December 26, 2019.  She has 4-5 BM per day.  No blood in the stool.  She sees breakthrough of sxs about 2 week prior to her next infusion. Previously on 5/5/2021, pt reports that she continues to have good and bad days with her UC.  She seems to have breakthrough sxs Previously on 9/8/2021, pt reports that she has no blood and diarrhea; she reports that  Previously on 1/12/2022, pt reports that she is having a lot of diarrhea, waking up at night for BM.  She is having bowel incontinence as well. (trigger was alcohol and coffee) Previously on 11/29/2022, pt reports that she has been doing very well, overate on Thanksgiving.  Remicade is doing very well, no rectal bleeding, no urgency. Previously on 1/5/2024, she notices slight flare about 1 week prior to the start of the infusion; however, does not want to increase the dose. . Today on 7/3/2024, pt reported that she went to ER after scope, had neg CT, was given abx for UTI and flagyl as well, and got better.  Has some episodes of constipation. . No joint pain or rashes. . Labs: 7/2020: IFX trough level 7.2 and AB neg 11/2019: IFX 13 (AB 36) every 7 week at 5mg/kg  2 week trough level, quant-gold indeterminate, CRP 7, ESR 11 11/2018: Hgb 13.1, ast 21, alt 19; hep B neg; B12 558 . SH: no smoke/etoh; some MJ use FH: no IBD . Colonoscopy: 6/2024: Preparation of the colon was poor. The examination was otherwise normal. Biopsies were obtained in the sigmoid colon, in the rectum and in the transverse colon. The cecum, ascending colon, transverse colon and descending colon are normal. Diffuse moderate inflammation was found in the sigmoid colon secondary to ulcerative colitis. . (A) Colon, Transverse, Biopsy (Cold Forceps): NO SIGNIFICANT ABNORMALITY. Negative for Dysplasia. (B) Colon, Sigmoid, Biopsy (Cold Forceps): CHRONIC MILDLY ACTIVE COLITIS. Negative for Dysplasia. (C) Rectum, Biopsy (Cold Forceps): CHRONIC INACTIVE COLITIS. Negative for Dysplasia. . 6/2023: The ascending colon appeared normal. Biopsies were taken with a cold forceps for histology. The pathology specimenwas placed into Bottle Number 1.The transverse colon appeared normal. Biopsies were taken with a cold forceps for histology. The pathology specimenwas placed into Bottle Number 2.The descending colon appeared normal. Biopsies were taken with a cold forceps for histology. The pathologyspecimen was placed into Bottle Number 3.A patchy area of mildly erythematous mucosa was found in the sigmoid colon. This was biopsied with a cold forceps forhistology. The pathology specimen was placed into Bottle Number 4.Diffuse mild inflammation characterized by erosions, friability and shallow ulcerations was found in the rectum. Biopsieswere taken with a cold forceps for histology. The pathology specimen was placed into Bottle Number 5.Overall stable control of disease state. . (A) Colon, Ascending, Biopsy:NO SIGNIFICANT ABNORMALITY.(B) Colon, Transverse, Biopsy:NO SIGNIFICANT ABNORMALITY.(C) Colon, Descending, Biopsy:NO SIGNIFICANT ABNORMALITY.(D) Colon, Sigmoid, Biopsy:NO SIGNIFICANT ABNORMALITY.(E) Rectum, Biopsy:DIFFUSE CHRONIC ACTIVE COLITIS, CONSISTENT WITH ULCERATIVE COLITIS. SeeComment.Negative for Infectious Organisms, Dysplasia or Malignancy.COMMENT: The biopsies show a diffuse and uniform chronic active colitis with a universal involvementof the rectum and absence of granulomas, supporting a diagnosis of ulcerative colitis. The presence ofhistological features of chronicity excludes acute infectious colitis. . 8/2021: The ascending colon appeared normal. Biopsies were taken with a cold forceps for histology. The pathology specimen was placed into Bottle Number 1. Findings: The transverse colon appeared normal. Biopsies were taken with a cold forceps for histology. The pathology specimen was placed into Bottle Number 2. The descending colon appeared normal. Biopsies were taken with a cold forceps for histology. The pathology specimen was placed into Bottle Number 3. A diffuse area of mildly erythematous mucosa was found in the sigmoid colon. This was biopsied with a cold forceps for histology. The pathology specimen was placed into Bottle Number 4. A continuous area of nonbleeding ulcerated mucosa with no stigmata of recent bleeding was present in the rectum and in the recto-sigmoid colon. Biopsies were taken with a cold forceps for histology. The pathology specimen was placed into Bottle Number 5 . (A) Colon, Ascending, Biopsy: NO SIGNIFICANT ABNORMALITY. (B) Colon, Transverse, Biopsy: NO SIGNIFICANT ABNORMALITY. (C) Colon, Descending, Biopsy: NO SIGNIFICANT ABNORMALITY. (D) Colon, Sigmoid, Biopsy: NO SIGNIFICANT ABNORMALITY. (E) Rectum, Biopsy: DIFFUSE CHRONIC ACTIVE PROCTITIS, CONSISTENT WITH ULCERATIVE COLITIS. See Comment. Negative for Infectious Organisms, Dysplasia or Malignancy. COMMENT: Ulcerative colitis that is limited to the rectum has been referred to as ulcerative proctitis. This disease is thought to be less severe and to have a lower rate of dysplasia and adenocarcinoma than cases of ulcerative pan-colitis. MICROSCOPIC DESCRIPT . 1/2019: A. Cecum , Biopsy: -Focal active colitis (see comment). -Negative for dysplasia or malignancy. B. Colon 70cm , Biopsy: -Colonic mucosa with no diagnostic abnormality.  -No evidence of chronic or active colitis.  -No granulomas seen.  -Negative for dysplasia or malignancy. C. Ascending Colon Polyp, Polypectomy: -Sessile serrated polyp. D. Colon 60cm , Biopsy: -Colonic mucosa with no diagnostic abnormality.  -No evidence of chronic or active colitis.  -No granulomas seen.  -Negative for dysplasia or malignancy. E. Colon 50cm , Biopsy: -Colonic mucosa with no diagnostic abnormality.  -No evidence of chronic or active colitis.  -No granulomas seen.  -Negative for dysplasia or malignancy. F. Colon 40cm , Biopsy: -Colonic mucosa with no diagnostic abnormality.  -No evidence of chronic or active colitis.  -No granulomas seen.  -Negative for dysplasia or malignancy. G. Colon 30cm , Biopsy: -Colonic mucosa with no diagnostic abnormality.  -No evidence of chronic or active colitis.  -No granulomas seen.  -Negative for dysplasia or malignancy. H. Colon 20cm , Biopsy: -Chronic active colitis, minimal, consistent with history of Ulcerative Colitis. -No granulomas, negative for dysplasia or malignancy. I . Rectum , Biopsy: -Chronic active colitis, mild, consistent with history of Ulcerative Colitis. -No granulomas, negative for dysplasia or malignancy. . 2017: A. CECUM (BIOPSY):     COLONIC MUCOSA WITH NO SIGNIFICANT HISTOPATHOLOGY.     NO HISTOLOGIC EVIDENCE OF ACTIVE, CHRONIC OR MICROSCOPIC COLITIS.  NO GRANULOMATA OR DYSPLASIA IDENTIFIED.    B. COLON, 70 CM (BIOPSY):     COLONIC MUCOSA WITH NO SIGNIFICANT HISTOPATHOLOGY.     NO HISTOLOGIC EVIDENCE OF ACTIVE, CHRONIC OR MICROSCOPIC COLITIS.  NO GRANULOMATA OR DYSPLASIA IDENTIFIED.      C. COLON, 60 CM (BIOPSY):      COLONIC MUCOSA WITH NO SIGNIFICANT HISTOPATHOLOGY.     NO HISTOLOGIC EVIDENCE OF ACTIVE, CHRONIC OR MICROSCOPIC COLITIS.  NO GRANULOMATA OR DYSPLASIA IDENTIFIED.      D. COLON, 50 CM (BIOPSY):     COLONIC MUCOSA WITH NO SIGNIFICANT HISTOPATHOLOGY.     NO HISTOLOGIC EVIDENCE OF ACTIVE, CHRONIC OR MICROSCOPIC COLITIS.  NO GRANULOMATA OR DYSPLASIA IDENTIFIED.      E. COLON, 40 CM (BIOPSY):     CHRONIC INACTIVE COLITIS.  NO GRANULOMATA OR DYSPLASIA IDENTIFIED.    F. COLON, 30 CM (BIOPSY):     CHRONIC INACTIVE COLITIS.  NO GRANULOMATA OR DYSPLASIA IDENTIFIED.      G. COLON, 20 CM   (BIOPSY):     CHRONIC INACTIVE COLITIS.  NO GRANULOMATA OR DYSPLASIA IDENTIFIED.      H. RECTUM, 10 CM (BIOPSY):     CHRONIC MILDLY ACTIVE COLITIS/PROCTITIS.  NO GRANULOMATA OR DYSPLASIA IDENTIFIED.  . 2016: A. CECUM-BIOPSY COLONIC MUCOSA WITH NO CRYPT DISTORTION, DISARRAY, OR SIGNIFICANT ACTIVE INFLAMMATION.   NEGATIVE FOR GRANULOMAS AND DYSPLASIA. B. COLON-BIOPSY, 70 CM COLONIC MUCOSA WITH NO CRYPT DISTORTION, DISARRAY, OR SIGNIFICANT ACTIVE INFLAMMATION.   NEGATIVE FOR GRANULOMAS AND DYSPLASIA. C. COLON-BIOPSY, 60 CM COLONIC MUCOSA WITH NO CRYPT DISTORTION, DISARRAY, OR SIGNIFICANT ACTIVE INFLAMMATION.   NEGATIVE FOR GRANULOMAS AND DYSPLASIA. D. COLON-BIOPSY, 50 CM COLONIC MUCOSA WITH NO CRYPT DISTORTION, DISARRAY, OR SIGNIFICANT ACTIVE INFLAMMATION.   NEGATIVE FOR GRANULOMAS AND DYSPLASIA. E. COLON-BIOPSY, 40 CM COLONIC MUCOSA WITH NO CRYPT DISTORTION, DISARRAY, OR SIGNIFICANT ACTIVE INFLAMMATION.   NEGATIVE FOR GRANULOMAS AND DYSPLASIA. F. COLON-BIOPSY, 30 CM COLONIC MUCOSA WITH NO CRYPT DISTORTION, DISARRAY, OR SIGNIFICANT ACTIVE INFLAMMATION.   NEGATIVE FOR GRANULOMAS AND DYSPLASIA. G. COLON-BIOPSY, 20 CM COLONIC MUCOSA WITH NO CRYPT DISTORTION, DISARRAY, OR SIGNIFICANT ACTIVE INFLAMMATION.   NEGATIVE FOR GRANULOMAS AND DYSPLASIA. H. RECTUM-BIOPSY CONSISTENT WITH ULCERATIVE COLITIS WITH MILD ACTIVE INFLAMMATION.  NEGATIVE FOR  GRANULOMAS AND DYSPLASIA. INCREASED LYMPHOPLASMACYTIC INFILTRATION, ACTIVE CRYPT INJURY, OR GRANULOMAS,  CONSISTENT WITH QUIESCENT INFLAMMATORY BOWEL DISEASE. H. COLON (BIOPSY), @ 10 CM ACTIVE CHRONIC COLITIS CONSISTENT MILDLY ACTIVE WITH INFLAMMATORY BOWEL DISEASE. NEGATIVE FOR GRANULOMAS AND DYSPLASIA. . 6/2015: A. CECUM-BIOPSY COLONIC MUCOSA WITH NO CRYPT DISTORTION, DISARRAY, OR SIGNIFICANT ACTIVE INFLAMMATION.   NEGATIVE FOR GRANULOMAS AND DYSPLASIA. B. COLON-BIOPSY, 70 CM COLONIC MUCOSA WITH NO CRYPT DISTORTION, DISARRAY, OR SIGNIFICANT ACTIVE INFLAMMATION.   NEGATIVE FOR GRANULOMAS AND DYSPLASIA. C. COLON-BIOPSY, 60 CM COLONIC MUCOSA WITH NO CRYPT DISTORTION, DISARRAY, OR SIGNIFICANT ACTIVE INFLAMMATION.   NEGATIVE FOR GRANULOMAS AND DYSPLASIA. D. COLON-BIOPSY, 50 CM COLONIC MUCOSA WITH NO CRYPT DISTORTION, DISARRAY, OR SIGNIFICANT ACTIVE INFLAMMATION.   NEGATIVE FOR GRANULOMAS AND DYSPLASIA. E. COLON-BIOPSY, 40 CM COLONIC MUCOSA WITH NO CRYPT DISTORTION, DISARRAY, OR SIGNIFICANT ACTIVE INFLAMMATION.   NEGATIVE FOR GRANULOMAS AND DYSPLASIA. F. COLON-BIOPSY, 30 CM COLONIC MUCOSA WITH NO CRYPT DISTORTION, DISARRAY, OR SIGNIFICANT ACTIVE INFLAMMATION.   NEGATIVE FOR GRANULOMAS AND DYSPLASIA. G. COLON-BIOPSY, 20 CM COLONIC MUCOSA WITH NO CRYPT DISTORTION, DISARRAY, OR SIGNIFICANT ACTIVE INFLAMMATION.   NEGATIVE FOR GRANULOMAS AND DYSPLASIA. H. RECTUM-BIOPSY CONSISTENT WITH ULCERATIVE COLITIS WITH MILD ACTIVE INFLAMMATION.  NEGATIVE FOR  GRANULOMAS AND DYSPLASIA. .

## 2024-07-08 ENCOUNTER — OFFICE VISIT (OUTPATIENT)
Dept: URBAN - METROPOLITAN AREA CLINIC 97 | Facility: CLINIC | Age: 69
End: 2024-07-08
Payer: MEDICARE

## 2024-07-08 VITALS
HEART RATE: 57 BPM | WEIGHT: 103 LBS | BODY MASS INDEX: 17.58 KG/M2 | DIASTOLIC BLOOD PRESSURE: 87 MMHG | SYSTOLIC BLOOD PRESSURE: 118 MMHG | HEIGHT: 64 IN | RESPIRATION RATE: 18 BRPM | TEMPERATURE: 97.9 F

## 2024-07-08 DIAGNOSIS — K51.80 OTHER ULCERATIVE COLITIS: ICD-10-CM

## 2024-07-08 PROCEDURE — 96413 CHEMO IV INFUSION 1 HR: CPT | Performed by: INTERNAL MEDICINE

## 2024-07-08 RX ORDER — ONDANSETRON HYDROCHLORIDE 4 MG/1
1 TABLET TABLET, FILM COATED ORAL EVERY 4 HOURS PRN
Qty: 3 | Refills: 0 | Status: ON HOLD | COMMUNITY
Start: 2024-05-16

## 2024-07-08 RX ORDER — DENOSUMAB 60 MG/ML
AS DIRECTED INJECTION SUBCUTANEOUS
Status: ACTIVE | COMMUNITY

## 2024-07-08 RX ORDER — PREDNISONE 10 MG/1
1 TABLET TABLET ORAL ONCE A DAY
Qty: 14 TABLET | Refills: 0 | Status: ACTIVE | COMMUNITY
Start: 2024-07-03 | End: 2024-07-17

## 2024-07-08 RX ORDER — INFLIXIMAB 100 MG/10ML
AS DIRECTED INJECTION, POWDER, LYOPHILIZED, FOR SOLUTION INTRAVENOUS
Status: ACTIVE | COMMUNITY

## 2024-07-08 RX ORDER — ONDANSETRON HYDROCHLORIDE 4 MG/1
1 TABLET TABLET, FILM COATED ORAL EVERY 4 HOURS PRN
Qty: 3 | Refills: 0 | Status: ON HOLD | COMMUNITY
Start: 2024-01-05

## 2024-07-08 RX ORDER — INFLIXIMAB 100 MG/10ML
AS DIRECTED INJECTION, POWDER, LYOPHILIZED, FOR SOLUTION INTRAVENOUS
Status: ACTIVE | COMMUNITY
Start: 2023-11-13

## 2024-07-09 LAB
A/G RATIO: 1
ABSOLUTE BASOPHILS: 71
ABSOLUTE EOSINOPHILS: 230
ABSOLUTE LYMPHOCYTES: 2118
ABSOLUTE MONOCYTES: 566
ABSOLUTE NEUTROPHILS: 2915
ALBUMIN: 4.1
ALKALINE PHOSPHATASE: 46
ALT (SGPT): 13
AST (SGOT): 17
BASOPHILS: 1.2
BILIRUBIN, TOTAL: 1
BUN/CREATININE RATIO: (no result)
BUN: 11
C-REACTIVE PROTEIN, QUANT: <3
CALCIUM: 9.3
CARBON DIOXIDE, TOTAL: 29
CHLORIDE: 98
CREATININE: 0.6
EGFR: 97
EOSINOPHILS: 3.9
GLOBULIN, TOTAL: 4.2
GLUCOSE: 89
HEMATOCRIT: 36.8
HEMOGLOBIN: 12.5
LYMPHOCYTES: 35.9
MCH: 30.3
MCHC: 34
MCV: 89.1
MONOCYTES: 9.6
MPV: 9.6
NEUTROPHILS: 49.4
PLATELET COUNT: 325
POTASSIUM: 4.1
PROTEIN, TOTAL: 8.3
RDW: 12.7
RED BLOOD CELL COUNT: 4.13
SODIUM: 134
WHITE BLOOD CELL COUNT: 5.9

## 2024-08-15 ENCOUNTER — OFFICE VISIT (OUTPATIENT)
Dept: URBAN - METROPOLITAN AREA CLINIC 97 | Facility: CLINIC | Age: 69
End: 2024-08-15
Payer: MEDICARE

## 2024-08-15 VITALS
TEMPERATURE: 97.6 F | BODY MASS INDEX: 17.24 KG/M2 | HEART RATE: 70 BPM | HEIGHT: 64 IN | RESPIRATION RATE: 20 BRPM | WEIGHT: 101 LBS | SYSTOLIC BLOOD PRESSURE: 119 MMHG | DIASTOLIC BLOOD PRESSURE: 77 MMHG

## 2024-08-15 DIAGNOSIS — K51.80 CHRONIC PANCOLONIC ULCERATIVE COLITIS: ICD-10-CM

## 2024-08-15 PROCEDURE — 96413 CHEMO IV INFUSION 1 HR: CPT | Performed by: INTERNAL MEDICINE

## 2024-08-15 RX ORDER — INFLIXIMAB 100 MG/10ML
AS DIRECTED INJECTION, POWDER, LYOPHILIZED, FOR SOLUTION INTRAVENOUS
Status: ACTIVE | COMMUNITY
Start: 2023-11-13

## 2024-08-15 RX ORDER — DENOSUMAB 60 MG/ML
AS DIRECTED INJECTION SUBCUTANEOUS
Status: ACTIVE | COMMUNITY

## 2024-08-15 RX ORDER — ONDANSETRON HYDROCHLORIDE 4 MG/1
1 TABLET TABLET, FILM COATED ORAL EVERY 4 HOURS PRN
Qty: 3 | Refills: 0 | Status: ON HOLD | COMMUNITY
Start: 2024-05-16

## 2024-08-15 RX ORDER — ONDANSETRON HYDROCHLORIDE 4 MG/1
1 TABLET TABLET, FILM COATED ORAL EVERY 4 HOURS PRN
Qty: 3 | Refills: 0 | Status: ON HOLD | COMMUNITY
Start: 2024-01-05

## 2024-08-15 RX ORDER — INFLIXIMAB 100 MG/10ML
AS DIRECTED INJECTION, POWDER, LYOPHILIZED, FOR SOLUTION INTRAVENOUS
Status: ACTIVE | COMMUNITY

## 2024-08-27 ENCOUNTER — OFFICE VISIT (OUTPATIENT)
Dept: URBAN - METROPOLITAN AREA TELEHEALTH 2 | Facility: TELEHEALTH | Age: 69
End: 2024-08-27

## 2024-08-27 RX ORDER — ONDANSETRON HYDROCHLORIDE 4 MG/1
1 TABLET TABLET, FILM COATED ORAL EVERY 4 HOURS PRN
Qty: 3 | Refills: 0 | Status: ON HOLD | COMMUNITY
Start: 2024-05-16

## 2024-08-27 RX ORDER — DENOSUMAB 60 MG/ML
AS DIRECTED INJECTION SUBCUTANEOUS
Status: ACTIVE | COMMUNITY

## 2024-08-27 RX ORDER — ONDANSETRON HYDROCHLORIDE 4 MG/1
1 TABLET TABLET, FILM COATED ORAL EVERY 4 HOURS PRN
Qty: 3 | Refills: 0 | Status: ON HOLD | COMMUNITY
Start: 2024-01-05

## 2024-08-27 RX ORDER — INFLIXIMAB 100 MG/10ML
AS DIRECTED INJECTION, POWDER, LYOPHILIZED, FOR SOLUTION INTRAVENOUS
Status: ACTIVE | COMMUNITY
Start: 2023-11-13

## 2024-08-27 RX ORDER — INFLIXIMAB 100 MG/10ML
AS DIRECTED INJECTION, POWDER, LYOPHILIZED, FOR SOLUTION INTRAVENOUS
Status: ACTIVE | COMMUNITY

## 2024-08-28 ENCOUNTER — OFFICE VISIT (OUTPATIENT)
Dept: URBAN - METROPOLITAN AREA CLINIC 96 | Facility: CLINIC | Age: 69
End: 2024-08-28

## 2024-09-16 ENCOUNTER — OFFICE VISIT (OUTPATIENT)
Dept: URBAN - METROPOLITAN AREA TELEHEALTH 2 | Facility: TELEHEALTH | Age: 69
End: 2024-09-16
Payer: MEDICARE

## 2024-09-16 VITALS — WEIGHT: 102 LBS | HEIGHT: 64 IN | BODY MASS INDEX: 17.42 KG/M2

## 2024-09-16 DIAGNOSIS — K51.00 ULCERATIVE COLITIS, UNIVERSAL, WITHOUT COMPLICATIONS: ICD-10-CM

## 2024-09-16 DIAGNOSIS — R63.4 WEIGHT LOSS: ICD-10-CM

## 2024-09-16 PROCEDURE — 97803 MED NUTRITION INDIV SUBSEQ: CPT | Performed by: DIETITIAN, REGISTERED

## 2024-09-16 RX ORDER — ONDANSETRON HYDROCHLORIDE 4 MG/1
1 TABLET TABLET, FILM COATED ORAL EVERY 4 HOURS PRN
Qty: 3 | Refills: 0 | Status: ON HOLD | COMMUNITY
Start: 2024-01-05

## 2024-09-16 RX ORDER — DENOSUMAB 60 MG/ML
AS DIRECTED INJECTION SUBCUTANEOUS
Status: ACTIVE | COMMUNITY

## 2024-09-16 RX ORDER — INFLIXIMAB 100 MG/10ML
AS DIRECTED INJECTION, POWDER, LYOPHILIZED, FOR SOLUTION INTRAVENOUS
Status: ACTIVE | COMMUNITY

## 2024-09-16 RX ORDER — ONDANSETRON HYDROCHLORIDE 4 MG/1
1 TABLET TABLET, FILM COATED ORAL EVERY 4 HOURS PRN
Qty: 3 | Refills: 0 | Status: ON HOLD | COMMUNITY
Start: 2024-05-16

## 2024-09-16 RX ORDER — INFLIXIMAB 100 MG/10ML
AS DIRECTED INJECTION, POWDER, LYOPHILIZED, FOR SOLUTION INTRAVENOUS
Status: ACTIVE | COMMUNITY
Start: 2023-11-13

## 2024-09-23 ENCOUNTER — OFFICE VISIT (OUTPATIENT)
Dept: URBAN - METROPOLITAN AREA CLINIC 97 | Facility: CLINIC | Age: 69
End: 2024-09-23
Payer: MEDICARE

## 2024-09-23 VITALS
DIASTOLIC BLOOD PRESSURE: 78 MMHG | HEART RATE: 56 BPM | HEIGHT: 64 IN | WEIGHT: 104 LBS | SYSTOLIC BLOOD PRESSURE: 139 MMHG | TEMPERATURE: 98.4 F | RESPIRATION RATE: 18 BRPM | BODY MASS INDEX: 17.75 KG/M2

## 2024-09-23 DIAGNOSIS — K51.80 OTHER ULCERATIVE COLITIS: ICD-10-CM

## 2024-09-23 PROCEDURE — 96413 CHEMO IV INFUSION 1 HR: CPT | Performed by: INTERNAL MEDICINE

## 2024-09-23 RX ORDER — ONDANSETRON HYDROCHLORIDE 4 MG/1
1 TABLET TABLET, FILM COATED ORAL EVERY 4 HOURS PRN
Qty: 3 | Refills: 0 | Status: ON HOLD | COMMUNITY
Start: 2024-01-05

## 2024-09-23 RX ORDER — INFLIXIMAB 100 MG/10ML
AS DIRECTED INJECTION, POWDER, LYOPHILIZED, FOR SOLUTION INTRAVENOUS
Status: ACTIVE | COMMUNITY
Start: 2023-11-13

## 2024-09-23 RX ORDER — ONDANSETRON HYDROCHLORIDE 4 MG/1
1 TABLET TABLET, FILM COATED ORAL EVERY 4 HOURS PRN
Qty: 3 | Refills: 0 | Status: ON HOLD | COMMUNITY
Start: 2024-05-16

## 2024-09-23 RX ORDER — INFLIXIMAB 100 MG/10ML
AS DIRECTED INJECTION, POWDER, LYOPHILIZED, FOR SOLUTION INTRAVENOUS
Status: ACTIVE | COMMUNITY

## 2024-09-23 RX ORDER — DENOSUMAB 60 MG/ML
AS DIRECTED INJECTION SUBCUTANEOUS
Status: ACTIVE | COMMUNITY

## 2024-09-24 ENCOUNTER — OFFICE VISIT (OUTPATIENT)
Dept: URBAN - METROPOLITAN AREA CLINIC 97 | Facility: CLINIC | Age: 69
End: 2024-09-24

## 2024-10-07 ENCOUNTER — OFFICE VISIT (OUTPATIENT)
Dept: URBAN - METROPOLITAN AREA TELEHEALTH 2 | Facility: TELEHEALTH | Age: 69
End: 2024-10-07

## 2024-10-07 RX ORDER — INFLIXIMAB 100 MG/10ML
AS DIRECTED INJECTION, POWDER, LYOPHILIZED, FOR SOLUTION INTRAVENOUS
Status: ACTIVE | COMMUNITY

## 2024-10-07 RX ORDER — INFLIXIMAB 100 MG/10ML
AS DIRECTED INJECTION, POWDER, LYOPHILIZED, FOR SOLUTION INTRAVENOUS
Status: ACTIVE | COMMUNITY
Start: 2023-11-13

## 2024-10-07 RX ORDER — DENOSUMAB 60 MG/ML
AS DIRECTED INJECTION SUBCUTANEOUS
Status: ACTIVE | COMMUNITY

## 2024-10-07 RX ORDER — ONDANSETRON HYDROCHLORIDE 4 MG/1
1 TABLET TABLET, FILM COATED ORAL EVERY 4 HOURS PRN
Qty: 3 | Refills: 0 | Status: ON HOLD | COMMUNITY
Start: 2024-05-16

## 2024-10-07 RX ORDER — ONDANSETRON HYDROCHLORIDE 4 MG/1
1 TABLET TABLET, FILM COATED ORAL EVERY 4 HOURS PRN
Qty: 3 | Refills: 0 | Status: ON HOLD | COMMUNITY
Start: 2024-01-05

## 2024-10-09 ENCOUNTER — OFFICE VISIT (OUTPATIENT)
Dept: URBAN - METROPOLITAN AREA CLINIC 96 | Facility: CLINIC | Age: 69
End: 2024-10-09
Payer: MEDICARE

## 2024-10-09 ENCOUNTER — LAB OUTSIDE AN ENCOUNTER (OUTPATIENT)
Dept: URBAN - METROPOLITAN AREA CLINIC 96 | Facility: CLINIC | Age: 69
End: 2024-10-09

## 2024-10-09 VITALS
RESPIRATION RATE: 18 BRPM | HEIGHT: 64 IN | HEART RATE: 52 BPM | WEIGHT: 101.8 LBS | TEMPERATURE: 97.7 F | SYSTOLIC BLOOD PRESSURE: 140 MMHG | BODY MASS INDEX: 17.38 KG/M2 | DIASTOLIC BLOOD PRESSURE: 75 MMHG

## 2024-10-09 DIAGNOSIS — K51.00 ULCERATIVE COLITIS, UNIVERSAL, WITHOUT COMPLICATIONS: ICD-10-CM

## 2024-10-09 DIAGNOSIS — D51.1 VIT B12 DEFIC ANEMIA D/T SLCTV VIT B12 MALABSORP W PROTEIN: ICD-10-CM

## 2024-10-09 DIAGNOSIS — E55.9 VITAMIN D DEFICIENCY: ICD-10-CM

## 2024-10-09 DIAGNOSIS — Z91.89 COLON CANCER HIGH RISK: ICD-10-CM

## 2024-10-09 DIAGNOSIS — Z09 FOLLOW UP: ICD-10-CM

## 2024-10-09 DIAGNOSIS — K59.04 CHRONIC IDIOPATHIC CONSTIPATION: ICD-10-CM

## 2024-10-09 DIAGNOSIS — Z79.899 LONG-TERM USE OF IMMUNOSUPPRESSANT MEDICATION: ICD-10-CM

## 2024-10-09 PROCEDURE — 99214 OFFICE O/P EST MOD 30 MIN: CPT | Performed by: INTERNAL MEDICINE

## 2024-10-09 RX ORDER — ONDANSETRON HYDROCHLORIDE 4 MG/1
1 TABLET TABLET, FILM COATED ORAL EVERY 4 HOURS PRN
Qty: 3 | Refills: 0 | Status: ON HOLD | COMMUNITY
Start: 2024-01-05

## 2024-10-09 RX ORDER — INFLIXIMAB 100 MG/10ML
AS DIRECTED INJECTION, POWDER, LYOPHILIZED, FOR SOLUTION INTRAVENOUS
OUTPATIENT
Start: 2024-10-09

## 2024-10-09 RX ORDER — INFLIXIMAB 100 MG/10ML
AS DIRECTED INJECTION, POWDER, LYOPHILIZED, FOR SOLUTION INTRAVENOUS
Status: ACTIVE | COMMUNITY

## 2024-10-09 RX ORDER — INFLIXIMAB 100 MG/10ML
AS DIRECTED INJECTION, POWDER, LYOPHILIZED, FOR SOLUTION INTRAVENOUS
OUTPATIENT

## 2024-10-09 RX ORDER — DENOSUMAB 60 MG/ML
AS DIRECTED INJECTION SUBCUTANEOUS
Status: ACTIVE | COMMUNITY

## 2024-10-09 RX ORDER — INFLIXIMAB 100 MG/10ML
AS DIRECTED INJECTION, POWDER, LYOPHILIZED, FOR SOLUTION INTRAVENOUS
Status: ACTIVE | COMMUNITY
Start: 2023-11-13

## 2024-10-09 RX ORDER — ONDANSETRON HYDROCHLORIDE 4 MG/1
1 TABLET TABLET, FILM COATED ORAL EVERY 4 HOURS PRN
Qty: 3 | Refills: 0 | Status: ON HOLD | COMMUNITY
Start: 2024-05-16

## 2024-10-09 RX ORDER — SODIUM SULFATE, MAGNESIUM SULFATE, AND POTASSIUM CHLORIDE 17.75; 2.7; 2.25 G/1; G/1; G/1
AS DIRECTED TABLET ORAL ONCE
Qty: 1 | Refills: 0 | OUTPATIENT
Start: 2024-10-09 | End: 2024-10-10

## 2024-10-09 NOTE — HPI-TODAY'S VISIT:
Pt Eloy is a 67 y/o with h/o pan-UC, currently on Remicade (every 6 weeks 7.5mg/kg) here for refractory dz. . Pt is referred by Dr. Morton. . She was diagnosed with UC around 34 years of age.  She had severe inflammation and diarrhea/bleeding.  Was given steroids.  She started Remicade in 2011, which has worked very well for her. She has used suppositories (had reactive diarrhea with Canasa) and oral agents. . Previously on 11/11/2019, pt reports that she has up 1-2 bm per day, no blood in the  stool, no abdominal pain.  No nausea or vomiting. Stress does exacerbate her sxs.  We started on Colazol . Previously on 12/10/2019, pt reports that she started to experience worsening diarrhea after starting the Colazol.  She got a sooner dose of Remicade. She stopped the Colazol and diarrhea improved.  However, she seems to have breakthrough sxs at 5-6 weeks.  She is prednsione 20mg. . Previously on 7/8/2020, pt reports that she had pneumonia (first time) on December 26, 2019.  She has 4-5 BM per day.  No blood in the stool.  She sees breakthrough of sxs about 2 week prior to her next infusion. Previously on 5/5/2021, pt reports that she continues to have good and bad days with her UC.  She seems to have breakthrough sxs Previously on 9/8/2021, pt reports that she has no blood and diarrhea; she reports that  Previously on 1/12/2022, pt reports that she is having a lot of diarrhea, waking up at night for BM.  She is having bowel incontinence as well. (trigger was alcohol and coffee) Previously on 11/29/2022, pt reports that she has been doing very well, overate on Thanksgiving.  Remicade is doing very well, no rectal bleeding, no urgency. Previously on 1/5/2024, she notices slight flare about 1 week prior to the start of the infusion; however, does not want to increase the dose. Previously on 7/3/2024, pt reported that she went to ER after scope, had neg CT, was given abx for UTI and flagyl as well, and got better.  Has some episodes of constipation. . Today on 10/9/2024 pt reports that she has done fantastic with daily miralax. No diarrhea or rectal bleeding.  Seeing nutritionist at . . No joint pain or rashes. . Labs: 7/2020: IFX trough level 7.2 and AB neg 11/2019: IFX 13 (AB 36) every 7 week at 5mg/kg  2 week trough level, quant-gold indeterminate, CRP 7, ESR 11 11/2018: Hgb 13.1, ast 21, alt 19; hep B neg; B12 558 . SH: no smoke/etoh; some MJ use FH: no IBD . Colonoscopy: 6/2024: Preparation of the colon was poor. The examination was otherwise normal. Biopsies were obtained in the sigmoid colon, in the rectum and in the transverse colon. The cecum, ascending colon, transverse colon and descending colon are normal. Diffuse moderate inflammation was found in the sigmoid colon secondary to ulcerative colitis. . (A) Colon, Transverse, Biopsy (Cold Forceps): NO SIGNIFICANT ABNORMALITY. Negative for Dysplasia. (B) Colon, Sigmoid, Biopsy (Cold Forceps): CHRONIC MILDLY ACTIVE COLITIS. Negative for Dysplasia. (C) Rectum, Biopsy (Cold Forceps): CHRONIC INACTIVE COLITIS. Negative for Dysplasia. . 6/2023: The ascending colon appeared normal. Biopsies were taken with a cold forceps for histology. The pathology specimenwas placed into Bottle Number 1.The transverse colon appeared normal. Biopsies were taken with a cold forceps for histology. The pathology specimenwas placed into Bottle Number 2.The descending colon appeared normal. Biopsies were taken with a cold forceps for histology. The pathologyspecimen was placed into Bottle Number 3.A patchy area of mildly erythematous mucosa was found in the sigmoid colon. This was biopsied with a cold forceps forhistology. The pathology specimen was placed into Bottle Number 4.Diffuse mild inflammation characterized by erosions, friability and shallow ulcerations was found in the rectum. Biopsieswere taken with a cold forceps for histology. The pathology specimen was placed into Bottle Number 5.Overall stable control of disease state. . (A) Colon, Ascending, Biopsy:NO SIGNIFICANT ABNORMALITY.(B) Colon, Transverse, Biopsy:NO SIGNIFICANT ABNORMALITY.(C) Colon, Descending, Biopsy:NO SIGNIFICANT ABNORMALITY.(D) Colon, Sigmoid, Biopsy:NO SIGNIFICANT ABNORMALITY.(E) Rectum, Biopsy:DIFFUSE CHRONIC ACTIVE COLITIS, CONSISTENT WITH ULCERATIVE COLITIS. SeeComment.Negative for Infectious Organisms, Dysplasia or Malignancy.COMMENT: The biopsies show a diffuse and uniform chronic active colitis with a universal involvementof the rectum and absence of granulomas, supporting a diagnosis of ulcerative colitis. The presence ofhistological features of chronicity excludes acute infectious colitis. . 8/2021: The ascending colon appeared normal. Biopsies were taken with a cold forceps for histology. The pathology specimen was placed into Bottle Number 1. Findings: The transverse colon appeared normal. Biopsies were taken with a cold forceps for histology. The pathology specimen was placed into Bottle Number 2. The descending colon appeared normal. Biopsies were taken with a cold forceps for histology. The pathology specimen was placed into Bottle Number 3. A diffuse area of mildly erythematous mucosa was found in the sigmoid colon. This was biopsied with a cold forceps for histology. The pathology specimen was placed into Bottle Number 4. A continuous area of nonbleeding ulcerated mucosa with no stigmata of recent bleeding was present in the rectum and in the recto-sigmoid colon. Biopsies were taken with a cold forceps for histology. The pathology specimen was placed into Bottle Number 5 . (A) Colon, Ascending, Biopsy: NO SIGNIFICANT ABNORMALITY. (B) Colon, Transverse, Biopsy: NO SIGNIFICANT ABNORMALITY. (C) Colon, Descending, Biopsy: NO SIGNIFICANT ABNORMALITY. (D) Colon, Sigmoid, Biopsy: NO SIGNIFICANT ABNORMALITY. (E) Rectum, Biopsy: DIFFUSE CHRONIC ACTIVE PROCTITIS, CONSISTENT WITH ULCERATIVE COLITIS. See Comment. Negative for Infectious Organisms, Dysplasia or Malignancy. COMMENT: Ulcerative colitis that is limited to the rectum has been referred to as ulcerative proctitis. This disease is thought to be less severe and to have a lower rate of dysplasia and adenocarcinoma than cases of ulcerative pan-colitis. MICROSCOPIC DESCRIPT . 1/2019: A. Cecum , Biopsy: -Focal active colitis (see comment). -Negative for dysplasia or malignancy. B. Colon 70cm , Biopsy: -Colonic mucosa with no diagnostic abnormality.  -No evidence of chronic or active colitis.  -No granulomas seen.  -Negative for dysplasia or malignancy. C. Ascending Colon Polyp, Polypectomy: -Sessile serrated polyp. D. Colon 60cm , Biopsy: -Colonic mucosa with no diagnostic abnormality.  -No evidence of chronic or active colitis.  -No granulomas seen.  -Negative for dysplasia or malignancy. E. Colon 50cm , Biopsy: -Colonic mucosa with no diagnostic abnormality.  -No evidence of chronic or active colitis.  -No granulomas seen.  -Negative for dysplasia or malignancy. F. Colon 40cm , Biopsy: -Colonic mucosa with no diagnostic abnormality.  -No evidence of chronic or active colitis.  -No granulomas seen.  -Negative for dysplasia or malignancy. G. Colon 30cm , Biopsy: -Colonic mucosa with no diagnostic abnormality.  -No evidence of chronic or active colitis.  -No granulomas seen.  -Negative for dysplasia or malignancy. H. Colon 20cm , Biopsy: -Chronic active colitis, minimal, consistent with history of Ulcerative Colitis. -No granulomas, negative for dysplasia or malignancy. I . Rectum , Biopsy: -Chronic active colitis, mild, consistent with history of Ulcerative Colitis. -No granulomas, negative for dysplasia or malignancy. . 2017: A. CECUM (BIOPSY):     COLONIC MUCOSA WITH NO SIGNIFICANT HISTOPATHOLOGY.     NO HISTOLOGIC EVIDENCE OF ACTIVE, CHRONIC OR MICROSCOPIC COLITIS.  NO GRANULOMATA OR DYSPLASIA IDENTIFIED.    B. COLON, 70 CM (BIOPSY):     COLONIC MUCOSA WITH NO SIGNIFICANT HISTOPATHOLOGY.     NO HISTOLOGIC EVIDENCE OF ACTIVE, CHRONIC OR MICROSCOPIC COLITIS.  NO GRANULOMATA OR DYSPLASIA IDENTIFIED.      C. COLON, 60 CM (BIOPSY):      COLONIC MUCOSA WITH NO SIGNIFICANT HISTOPATHOLOGY.     NO HISTOLOGIC EVIDENCE OF ACTIVE, CHRONIC OR MICROSCOPIC COLITIS.  NO GRANULOMATA OR DYSPLASIA IDENTIFIED.      D. COLON, 50 CM (BIOPSY):     COLONIC MUCOSA WITH NO SIGNIFICANT HISTOPATHOLOGY.     NO HISTOLOGIC EVIDENCE OF ACTIVE, CHRONIC OR MICROSCOPIC COLITIS.  NO GRANULOMATA OR DYSPLASIA IDENTIFIED.      E. COLON, 40 CM (BIOPSY):     CHRONIC INACTIVE COLITIS.  NO GRANULOMATA OR DYSPLASIA IDENTIFIED.    F. COLON, 30 CM (BIOPSY):     CHRONIC INACTIVE COLITIS.  NO GRANULOMATA OR DYSPLASIA IDENTIFIED.      G. COLON, 20 CM   (BIOPSY):     CHRONIC INACTIVE COLITIS.  NO GRANULOMATA OR DYSPLASIA IDENTIFIED.      H. RECTUM, 10 CM (BIOPSY):     CHRONIC MILDLY ACTIVE COLITIS/PROCTITIS.  NO GRANULOMATA OR DYSPLASIA IDENTIFIED.  . 2016: A. CECUM-BIOPSY COLONIC MUCOSA WITH NO CRYPT DISTORTION, DISARRAY, OR SIGNIFICANT ACTIVE INFLAMMATION.   NEGATIVE FOR GRANULOMAS AND DYSPLASIA. B. COLON-BIOPSY, 70 CM COLONIC MUCOSA WITH NO CRYPT DISTORTION, DISARRAY, OR SIGNIFICANT ACTIVE INFLAMMATION.   NEGATIVE FOR GRANULOMAS AND DYSPLASIA. C. COLON-BIOPSY, 60 CM COLONIC MUCOSA WITH NO CRYPT DISTORTION, DISARRAY, OR SIGNIFICANT ACTIVE INFLAMMATION.   NEGATIVE FOR GRANULOMAS AND DYSPLASIA. D. COLON-BIOPSY, 50 CM COLONIC MUCOSA WITH NO CRYPT DISTORTION, DISARRAY, OR SIGNIFICANT ACTIVE INFLAMMATION.   NEGATIVE FOR GRANULOMAS AND DYSPLASIA. E. COLON-BIOPSY, 40 CM COLONIC MUCOSA WITH NO CRYPT DISTORTION, DISARRAY, OR SIGNIFICANT ACTIVE INFLAMMATION.   NEGATIVE FOR GRANULOMAS AND DYSPLASIA. F. COLON-BIOPSY, 30 CM COLONIC MUCOSA WITH NO CRYPT DISTORTION, DISARRAY, OR SIGNIFICANT ACTIVE INFLAMMATION.   NEGATIVE FOR GRANULOMAS AND DYSPLASIA. G. COLON-BIOPSY, 20 CM COLONIC MUCOSA WITH NO CRYPT DISTORTION, DISARRAY, OR SIGNIFICANT ACTIVE INFLAMMATION.   NEGATIVE FOR GRANULOMAS AND DYSPLASIA. H. RECTUM-BIOPSY CONSISTENT WITH ULCERATIVE COLITIS WITH MILD ACTIVE INFLAMMATION.  NEGATIVE FOR  GRANULOMAS AND DYSPLASIA. INCREASED LYMPHOPLASMACYTIC INFILTRATION, ACTIVE CRYPT INJURY, OR GRANULOMAS,  CONSISTENT WITH QUIESCENT INFLAMMATORY BOWEL DISEASE. H. COLON (BIOPSY), @ 10 CM ACTIVE CHRONIC COLITIS CONSISTENT MILDLY ACTIVE WITH INFLAMMATORY BOWEL DISEASE. NEGATIVE FOR GRANULOMAS AND DYSPLASIA. . 6/2015: A. CECUM-BIOPSY COLONIC MUCOSA WITH NO CRYPT DISTORTION, DISARRAY, OR SIGNIFICANT ACTIVE INFLAMMATION.   NEGATIVE FOR GRANULOMAS AND DYSPLASIA. B. COLON-BIOPSY, 70 CM COLONIC MUCOSA WITH NO CRYPT DISTORTION, DISARRAY, OR SIGNIFICANT ACTIVE INFLAMMATION.   NEGATIVE FOR GRANULOMAS AND DYSPLASIA. C. COLON-BIOPSY, 60 CM COLONIC MUCOSA WITH NO CRYPT DISTORTION, DISARRAY, OR SIGNIFICANT ACTIVE INFLAMMATION.   NEGATIVE FOR GRANULOMAS AND DYSPLASIA. D. COLON-BIOPSY, 50 CM COLONIC MUCOSA WITH NO CRYPT DISTORTION, DISARRAY, OR SIGNIFICANT ACTIVE INFLAMMATION.   NEGATIVE FOR GRANULOMAS AND DYSPLASIA. E. COLON-BIOPSY, 40 CM COLONIC MUCOSA WITH NO CRYPT DISTORTION, DISARRAY, OR SIGNIFICANT ACTIVE INFLAMMATION.   NEGATIVE FOR GRANULOMAS AND DYSPLASIA. F. COLON-BIOPSY, 30 CM COLONIC MUCOSA WITH NO CRYPT DISTORTION, DISARRAY, OR SIGNIFICANT ACTIVE INFLAMMATION.   NEGATIVE FOR GRANULOMAS AND DYSPLASIA. G. COLON-BIOPSY, 20 CM COLONIC MUCOSA WITH NO CRYPT DISTORTION, DISARRAY, OR SIGNIFICANT ACTIVE INFLAMMATION.   NEGATIVE FOR GRANULOMAS AND DYSPLASIA. H. RECTUM-BIOPSY CONSISTENT WITH ULCERATIVE COLITIS WITH MILD ACTIVE INFLAMMATION.  NEGATIVE FOR  GRANULOMAS AND DYSPLASIA. .

## 2024-10-29 ENCOUNTER — OFFICE VISIT (OUTPATIENT)
Dept: URBAN - METROPOLITAN AREA CLINIC 97 | Facility: CLINIC | Age: 69
End: 2024-10-29
Payer: MEDICARE

## 2024-10-29 VITALS
WEIGHT: 103 LBS | BODY MASS INDEX: 17.58 KG/M2 | RESPIRATION RATE: 18 BRPM | SYSTOLIC BLOOD PRESSURE: 127 MMHG | TEMPERATURE: 98.3 F | HEART RATE: 64 BPM | HEIGHT: 64 IN | DIASTOLIC BLOOD PRESSURE: 88 MMHG

## 2024-10-29 DIAGNOSIS — K51.90 CHRONIC ULCERATIVE COLITIS: ICD-10-CM

## 2024-10-29 PROCEDURE — 96413 CHEMO IV INFUSION 1 HR: CPT | Performed by: INTERNAL MEDICINE

## 2024-10-29 RX ORDER — INFLIXIMAB 100 MG/10ML
AS DIRECTED INJECTION, POWDER, LYOPHILIZED, FOR SOLUTION INTRAVENOUS
Status: ACTIVE | COMMUNITY
Start: 2024-10-09

## 2024-10-29 RX ORDER — ONDANSETRON HYDROCHLORIDE 4 MG/1
1 TABLET TABLET, FILM COATED ORAL EVERY 4 HOURS PRN
Qty: 3 | Refills: 0 | Status: ON HOLD | COMMUNITY
Start: 2024-05-16

## 2024-10-29 RX ORDER — DENOSUMAB 60 MG/ML
AS DIRECTED INJECTION SUBCUTANEOUS
Status: ACTIVE | COMMUNITY

## 2024-10-29 RX ORDER — INFLIXIMAB 100 MG/10ML
AS DIRECTED INJECTION, POWDER, LYOPHILIZED, FOR SOLUTION INTRAVENOUS
Status: ACTIVE | COMMUNITY

## 2024-10-29 RX ORDER — INFLIXIMAB 100 MG/10ML
AS DIRECTED INJECTION, POWDER, LYOPHILIZED, FOR SOLUTION INTRAVENOUS
Status: ACTIVE | COMMUNITY
Start: 2023-11-13

## 2024-10-29 RX ORDER — ONDANSETRON HYDROCHLORIDE 4 MG/1
1 TABLET TABLET, FILM COATED ORAL EVERY 4 HOURS PRN
Qty: 3 | Refills: 0 | Status: ON HOLD | COMMUNITY
Start: 2024-01-05

## 2024-12-02 ENCOUNTER — TELEPHONE ENCOUNTER (OUTPATIENT)
Dept: URBAN - METROPOLITAN AREA CLINIC 96 | Facility: CLINIC | Age: 69
End: 2024-12-02

## 2024-12-06 ENCOUNTER — OFFICE VISIT (OUTPATIENT)
Dept: URBAN - METROPOLITAN AREA CLINIC 97 | Facility: CLINIC | Age: 69
End: 2024-12-06

## 2024-12-09 ENCOUNTER — OFFICE VISIT (OUTPATIENT)
Dept: URBAN - METROPOLITAN AREA CLINIC 97 | Facility: CLINIC | Age: 69
End: 2024-12-09
Payer: MEDICARE

## 2024-12-09 VITALS
SYSTOLIC BLOOD PRESSURE: 132 MMHG | DIASTOLIC BLOOD PRESSURE: 84 MMHG | HEIGHT: 64 IN | WEIGHT: 104 LBS | RESPIRATION RATE: 20 BRPM | BODY MASS INDEX: 17.75 KG/M2 | HEART RATE: 78 BPM | TEMPERATURE: 98.2 F

## 2024-12-09 DIAGNOSIS — K51.00 ULCERATIVE COLITIS, UNIVERSAL, WITHOUT COMPLICATIONS: ICD-10-CM

## 2024-12-09 PROCEDURE — 96413 CHEMO IV INFUSION 1 HR: CPT | Performed by: INTERNAL MEDICINE

## 2024-12-09 RX ORDER — ONDANSETRON HYDROCHLORIDE 4 MG/1
1 TABLET TABLET, FILM COATED ORAL EVERY 4 HOURS PRN
Qty: 3 | Refills: 0 | Status: ON HOLD | COMMUNITY
Start: 2024-01-05

## 2024-12-09 RX ORDER — INFLIXIMAB 100 MG/10ML
AS DIRECTED INJECTION, POWDER, LYOPHILIZED, FOR SOLUTION INTRAVENOUS
Status: ACTIVE | COMMUNITY
Start: 2024-10-09

## 2024-12-09 RX ORDER — INFLIXIMAB 100 MG/10ML
AS DIRECTED INJECTION, POWDER, LYOPHILIZED, FOR SOLUTION INTRAVENOUS
Status: ACTIVE | COMMUNITY

## 2024-12-09 RX ORDER — DENOSUMAB 60 MG/ML
AS DIRECTED INJECTION SUBCUTANEOUS
Status: ACTIVE | COMMUNITY

## 2024-12-09 RX ORDER — INFLIXIMAB 100 MG/10ML
AS DIRECTED INJECTION, POWDER, LYOPHILIZED, FOR SOLUTION INTRAVENOUS
Status: ACTIVE | COMMUNITY
Start: 2023-11-13

## 2024-12-09 RX ORDER — ONDANSETRON HYDROCHLORIDE 4 MG/1
1 TABLET TABLET, FILM COATED ORAL EVERY 4 HOURS PRN
Qty: 3 | Refills: 0 | Status: ON HOLD | COMMUNITY
Start: 2024-05-16

## 2025-01-13 ENCOUNTER — OFFICE VISIT (OUTPATIENT)
Dept: URBAN - METROPOLITAN AREA CLINIC 97 | Facility: CLINIC | Age: 70
End: 2025-01-13
Payer: MEDICARE

## 2025-01-13 VITALS
SYSTOLIC BLOOD PRESSURE: 127 MMHG | DIASTOLIC BLOOD PRESSURE: 80 MMHG | BODY MASS INDEX: 17.58 KG/M2 | RESPIRATION RATE: 18 BRPM | HEIGHT: 64 IN | TEMPERATURE: 98.4 F | WEIGHT: 103 LBS | HEART RATE: 71 BPM

## 2025-01-13 DIAGNOSIS — K51.00 ULCERATIVE COLITIS, UNIVERSAL, WITHOUT COMPLICATIONS: ICD-10-CM

## 2025-01-13 PROCEDURE — 96413 CHEMO IV INFUSION 1 HR: CPT | Performed by: INTERNAL MEDICINE

## 2025-01-13 RX ORDER — DENOSUMAB 60 MG/ML
AS DIRECTED INJECTION SUBCUTANEOUS
Status: ACTIVE | COMMUNITY

## 2025-01-13 RX ORDER — INFLIXIMAB 100 MG/10ML
AS DIRECTED INJECTION, POWDER, LYOPHILIZED, FOR SOLUTION INTRAVENOUS
Status: ACTIVE | COMMUNITY
Start: 2023-11-13

## 2025-01-13 RX ORDER — ONDANSETRON HYDROCHLORIDE 4 MG/1
1 TABLET TABLET, FILM COATED ORAL EVERY 4 HOURS PRN
Qty: 3 | Refills: 0 | Status: ON HOLD | COMMUNITY
Start: 2024-01-05

## 2025-01-13 RX ORDER — INFLIXIMAB 100 MG/10ML
AS DIRECTED INJECTION, POWDER, LYOPHILIZED, FOR SOLUTION INTRAVENOUS
Status: ACTIVE | COMMUNITY
Start: 2024-10-09

## 2025-01-13 RX ORDER — ONDANSETRON HYDROCHLORIDE 4 MG/1
1 TABLET TABLET, FILM COATED ORAL EVERY 4 HOURS PRN
Qty: 3 | Refills: 0 | Status: ON HOLD | COMMUNITY
Start: 2024-05-16

## 2025-01-13 RX ORDER — INFLIXIMAB 100 MG/10ML
AS DIRECTED INJECTION, POWDER, LYOPHILIZED, FOR SOLUTION INTRAVENOUS
Status: ACTIVE | COMMUNITY

## 2025-02-13 ENCOUNTER — TELEPHONE ENCOUNTER (OUTPATIENT)
Dept: URBAN - METROPOLITAN AREA CLINIC 6 | Facility: CLINIC | Age: 70
End: 2025-02-13

## 2025-02-18 ENCOUNTER — OFFICE VISIT (OUTPATIENT)
Dept: URBAN - METROPOLITAN AREA CLINIC 97 | Facility: CLINIC | Age: 70
End: 2025-02-18
Payer: MEDICARE

## 2025-02-18 VITALS
RESPIRATION RATE: 18 BRPM | WEIGHT: 104 LBS | HEIGHT: 64 IN | BODY MASS INDEX: 17.75 KG/M2 | TEMPERATURE: 98.6 F | SYSTOLIC BLOOD PRESSURE: 115 MMHG | DIASTOLIC BLOOD PRESSURE: 70 MMHG | HEART RATE: 65 BPM

## 2025-02-18 DIAGNOSIS — K51.00 ULCERATIVE COLITIS, UNIVERSAL, WITHOUT COMPLICATIONS: ICD-10-CM

## 2025-02-18 PROCEDURE — 96413 CHEMO IV INFUSION 1 HR: CPT | Performed by: INTERNAL MEDICINE

## 2025-02-18 RX ORDER — DENOSUMAB 60 MG/ML
AS DIRECTED INJECTION SUBCUTANEOUS
Status: ACTIVE | COMMUNITY

## 2025-02-18 RX ORDER — INFLIXIMAB 100 MG/10ML
AS DIRECTED INJECTION, POWDER, LYOPHILIZED, FOR SOLUTION INTRAVENOUS
Status: ACTIVE | COMMUNITY

## 2025-02-18 RX ORDER — INFLIXIMAB 100 MG/10ML
AS DIRECTED INJECTION, POWDER, LYOPHILIZED, FOR SOLUTION INTRAVENOUS
Status: ACTIVE | COMMUNITY
Start: 2023-11-13

## 2025-02-18 RX ORDER — ONDANSETRON HYDROCHLORIDE 4 MG/1
1 TABLET TABLET, FILM COATED ORAL EVERY 4 HOURS PRN
Qty: 3 | Refills: 0 | Status: ON HOLD | COMMUNITY
Start: 2024-05-16

## 2025-02-18 RX ORDER — INFLIXIMAB 100 MG/10ML
AS DIRECTED INJECTION, POWDER, LYOPHILIZED, FOR SOLUTION INTRAVENOUS
Status: ACTIVE | COMMUNITY
Start: 2024-10-09

## 2025-02-18 RX ORDER — ONDANSETRON HYDROCHLORIDE 4 MG/1
1 TABLET TABLET, FILM COATED ORAL EVERY 4 HOURS PRN
Qty: 3 | Refills: 0 | Status: ON HOLD | COMMUNITY
Start: 2024-01-05

## 2025-03-20 ENCOUNTER — OFFICE VISIT (OUTPATIENT)
Dept: URBAN - METROPOLITAN AREA CLINIC 97 | Facility: CLINIC | Age: 70
End: 2025-03-20

## 2025-03-25 ENCOUNTER — OFFICE VISIT (OUTPATIENT)
Dept: URBAN - METROPOLITAN AREA CLINIC 97 | Facility: CLINIC | Age: 70
End: 2025-03-25
Payer: MEDICARE

## 2025-03-25 DIAGNOSIS — K51.00 ULCERATIVE COLITIS, UNIVERSAL, WITHOUT COMPLICATIONS: ICD-10-CM

## 2025-03-25 PROCEDURE — 96413 CHEMO IV INFUSION 1 HR: CPT | Performed by: INTERNAL MEDICINE

## 2025-03-25 RX ORDER — INFLIXIMAB 100 MG/10ML
AS DIRECTED INJECTION, POWDER, LYOPHILIZED, FOR SOLUTION INTRAVENOUS
Status: ACTIVE | COMMUNITY
Start: 2023-11-13

## 2025-03-25 RX ORDER — DENOSUMAB 60 MG/ML
AS DIRECTED INJECTION SUBCUTANEOUS
Status: ACTIVE | COMMUNITY

## 2025-03-25 RX ORDER — ONDANSETRON HYDROCHLORIDE 4 MG/1
1 TABLET TABLET, FILM COATED ORAL EVERY 4 HOURS PRN
Qty: 3 | Refills: 0 | Status: ON HOLD | COMMUNITY
Start: 2024-01-05

## 2025-03-25 RX ORDER — INFLIXIMAB 100 MG/10ML
AS DIRECTED INJECTION, POWDER, LYOPHILIZED, FOR SOLUTION INTRAVENOUS
Status: ACTIVE | COMMUNITY

## 2025-03-25 RX ORDER — INFLIXIMAB 100 MG/10ML
AS DIRECTED INJECTION, POWDER, LYOPHILIZED, FOR SOLUTION INTRAVENOUS
Status: ACTIVE | COMMUNITY
Start: 2024-10-09

## 2025-03-25 RX ORDER — ONDANSETRON HYDROCHLORIDE 4 MG/1
1 TABLET TABLET, FILM COATED ORAL EVERY 4 HOURS PRN
Qty: 3 | Refills: 0 | Status: ON HOLD | COMMUNITY
Start: 2024-05-16

## 2025-04-08 ENCOUNTER — OFFICE VISIT (OUTPATIENT)
Dept: URBAN - METROPOLITAN AREA CLINIC 96 | Facility: CLINIC | Age: 70
End: 2025-04-08

## 2025-04-29 ENCOUNTER — OFFICE VISIT (OUTPATIENT)
Dept: URBAN - METROPOLITAN AREA CLINIC 97 | Facility: CLINIC | Age: 70
End: 2025-04-29
Payer: MEDICARE

## 2025-04-29 DIAGNOSIS — K51.00 ULCERATIVE COLITIS, UNIVERSAL, WITHOUT COMPLICATIONS: ICD-10-CM

## 2025-04-29 PROCEDURE — 96413 CHEMO IV INFUSION 1 HR: CPT | Performed by: INTERNAL MEDICINE

## 2025-04-29 RX ORDER — ONDANSETRON HYDROCHLORIDE 4 MG/1
1 TABLET TABLET, FILM COATED ORAL EVERY 4 HOURS PRN
Qty: 3 | Refills: 0 | Status: ON HOLD | COMMUNITY
Start: 2024-01-05

## 2025-04-29 RX ORDER — INFLIXIMAB 100 MG/10ML
AS DIRECTED INJECTION, POWDER, LYOPHILIZED, FOR SOLUTION INTRAVENOUS
Status: ACTIVE | COMMUNITY
Start: 2023-11-13

## 2025-04-29 RX ORDER — INFLIXIMAB 100 MG/10ML
AS DIRECTED INJECTION, POWDER, LYOPHILIZED, FOR SOLUTION INTRAVENOUS
Status: ACTIVE | COMMUNITY
Start: 2024-10-09

## 2025-04-29 RX ORDER — DENOSUMAB 60 MG/ML
AS DIRECTED INJECTION SUBCUTANEOUS
Status: ACTIVE | COMMUNITY

## 2025-04-29 RX ORDER — ONDANSETRON HYDROCHLORIDE 4 MG/1
1 TABLET TABLET, FILM COATED ORAL EVERY 4 HOURS PRN
Qty: 3 | Refills: 0 | Status: ON HOLD | COMMUNITY
Start: 2024-05-16

## 2025-04-29 RX ORDER — INFLIXIMAB 100 MG/10ML
AS DIRECTED INJECTION, POWDER, LYOPHILIZED, FOR SOLUTION INTRAVENOUS
Status: ACTIVE | COMMUNITY

## 2025-06-03 ENCOUNTER — OFFICE VISIT (OUTPATIENT)
Dept: URBAN - METROPOLITAN AREA CLINIC 97 | Facility: CLINIC | Age: 70
End: 2025-06-03
Payer: MEDICARE

## 2025-06-03 DIAGNOSIS — K51.00 ACUTE ULCERATIVE PANCOLITIS: ICD-10-CM

## 2025-06-03 PROCEDURE — 96413 CHEMO IV INFUSION 1 HR: CPT | Performed by: INTERNAL MEDICINE

## 2025-06-03 RX ORDER — DENOSUMAB 60 MG/ML
AS DIRECTED INJECTION SUBCUTANEOUS
Status: ACTIVE | COMMUNITY

## 2025-06-03 RX ORDER — INFLIXIMAB 100 MG/10ML
AS DIRECTED INJECTION, POWDER, LYOPHILIZED, FOR SOLUTION INTRAVENOUS
Status: ACTIVE | COMMUNITY
Start: 2024-10-09

## 2025-06-03 RX ORDER — ONDANSETRON HYDROCHLORIDE 4 MG/1
1 TABLET TABLET, FILM COATED ORAL EVERY 4 HOURS PRN
Qty: 3 | Refills: 0 | Status: ON HOLD | COMMUNITY
Start: 2024-01-05

## 2025-06-03 RX ORDER — ONDANSETRON HYDROCHLORIDE 4 MG/1
1 TABLET TABLET, FILM COATED ORAL EVERY 4 HOURS PRN
Qty: 3 | Refills: 0 | Status: ON HOLD | COMMUNITY
Start: 2024-05-16

## 2025-06-03 RX ORDER — INFLIXIMAB 100 MG/10ML
AS DIRECTED INJECTION, POWDER, LYOPHILIZED, FOR SOLUTION INTRAVENOUS
Status: ACTIVE | COMMUNITY
Start: 2023-11-13

## 2025-06-03 RX ORDER — INFLIXIMAB 100 MG/10ML
AS DIRECTED INJECTION, POWDER, LYOPHILIZED, FOR SOLUTION INTRAVENOUS
Status: ACTIVE | COMMUNITY

## 2025-06-26 ENCOUNTER — OFFICE VISIT (OUTPATIENT)
Dept: URBAN - METROPOLITAN AREA SURGERY CENTER 18 | Facility: SURGERY CENTER | Age: 70
End: 2025-06-26

## 2025-06-26 ENCOUNTER — CLAIMS CREATED FROM THE CLAIM WINDOW (OUTPATIENT)
Dept: URBAN - METROPOLITAN AREA CLINIC 4 | Facility: CLINIC | Age: 70
End: 2025-06-26
Payer: MEDICARE

## 2025-06-26 DIAGNOSIS — K63.89 OTHER SPECIFIED DISEASES OF INTESTINE: ICD-10-CM

## 2025-06-26 DIAGNOSIS — K51.919 ULCERATIVE COLITIS, UNSPECIFIED WITH UNSPECIFIED COMPLICATIONS: ICD-10-CM

## 2025-06-26 DIAGNOSIS — D12.2 BENIGN NEOPLASM OF ASCENDING COLON: ICD-10-CM

## 2025-06-26 PROCEDURE — 88305 TISSUE EXAM BY PATHOLOGIST: CPT | Performed by: PATHOLOGY

## 2025-06-26 RX ORDER — INFLIXIMAB 100 MG/10ML
AS DIRECTED INJECTION, POWDER, LYOPHILIZED, FOR SOLUTION INTRAVENOUS
Status: ACTIVE | COMMUNITY
Start: 2024-10-09

## 2025-06-26 RX ORDER — INFLIXIMAB 100 MG/10ML
AS DIRECTED INJECTION, POWDER, LYOPHILIZED, FOR SOLUTION INTRAVENOUS
Status: ACTIVE | COMMUNITY
Start: 2023-11-13

## 2025-06-26 RX ORDER — ONDANSETRON HYDROCHLORIDE 4 MG/1
1 TABLET TABLET, FILM COATED ORAL EVERY 4 HOURS PRN
Qty: 3 | Refills: 0 | Status: ON HOLD | COMMUNITY
Start: 2024-01-05

## 2025-06-26 RX ORDER — DENOSUMAB 60 MG/ML
AS DIRECTED INJECTION SUBCUTANEOUS
Status: ACTIVE | COMMUNITY

## 2025-06-26 RX ORDER — INFLIXIMAB 100 MG/10ML
AS DIRECTED INJECTION, POWDER, LYOPHILIZED, FOR SOLUTION INTRAVENOUS
Status: ACTIVE | COMMUNITY

## 2025-06-26 RX ORDER — ONDANSETRON HYDROCHLORIDE 4 MG/1
1 TABLET TABLET, FILM COATED ORAL EVERY 4 HOURS PRN
Qty: 3 | Refills: 0 | Status: ON HOLD | COMMUNITY
Start: 2024-05-16

## 2025-07-08 ENCOUNTER — OFFICE VISIT (OUTPATIENT)
Dept: URBAN - METROPOLITAN AREA CLINIC 97 | Facility: CLINIC | Age: 70
End: 2025-07-08
Payer: MEDICARE

## 2025-07-08 DIAGNOSIS — K51.00 ACUTE ULCERATIVE PANCOLITIS: ICD-10-CM

## 2025-07-08 PROCEDURE — 96413 CHEMO IV INFUSION 1 HR: CPT | Performed by: INTERNAL MEDICINE

## 2025-07-08 RX ORDER — INFLIXIMAB 100 MG/10ML
AS DIRECTED INJECTION, POWDER, LYOPHILIZED, FOR SOLUTION INTRAVENOUS
Status: ACTIVE | COMMUNITY

## 2025-07-08 RX ORDER — ONDANSETRON HYDROCHLORIDE 4 MG/1
1 TABLET TABLET, FILM COATED ORAL EVERY 4 HOURS PRN
Qty: 3 | Refills: 0 | Status: ON HOLD | COMMUNITY
Start: 2024-01-05

## 2025-07-08 RX ORDER — DENOSUMAB 60 MG/ML
AS DIRECTED INJECTION SUBCUTANEOUS
Status: ACTIVE | COMMUNITY

## 2025-07-08 RX ORDER — INFLIXIMAB 100 MG/10ML
AS DIRECTED INJECTION, POWDER, LYOPHILIZED, FOR SOLUTION INTRAVENOUS
Status: ACTIVE | COMMUNITY
Start: 2024-10-09

## 2025-07-08 RX ORDER — INFLIXIMAB 100 MG/10ML
AS DIRECTED INJECTION, POWDER, LYOPHILIZED, FOR SOLUTION INTRAVENOUS
Status: ACTIVE | COMMUNITY
Start: 2023-11-13

## 2025-07-08 RX ORDER — ONDANSETRON HYDROCHLORIDE 4 MG/1
1 TABLET TABLET, FILM COATED ORAL EVERY 4 HOURS PRN
Qty: 3 | Refills: 0 | Status: ON HOLD | COMMUNITY
Start: 2024-05-16

## 2025-08-14 ENCOUNTER — OFFICE VISIT (OUTPATIENT)
Dept: URBAN - METROPOLITAN AREA CLINIC 97 | Facility: CLINIC | Age: 70
End: 2025-08-14
Payer: MEDICARE

## 2025-08-14 DIAGNOSIS — K51.00 ULCERATIVE COLITIS, UNIVERSAL, WITHOUT COMPLICATIONS: ICD-10-CM

## 2025-08-14 PROCEDURE — 96413 CHEMO IV INFUSION 1 HR: CPT | Performed by: INTERNAL MEDICINE

## 2025-08-14 RX ORDER — INFLIXIMAB 100 MG/10ML
AS DIRECTED INJECTION, POWDER, LYOPHILIZED, FOR SOLUTION INTRAVENOUS
Status: ACTIVE | COMMUNITY

## 2025-08-14 RX ORDER — ONDANSETRON HYDROCHLORIDE 4 MG/1
1 TABLET TABLET, FILM COATED ORAL EVERY 4 HOURS PRN
Qty: 3 | Refills: 0 | Status: ON HOLD | COMMUNITY
Start: 2024-01-05

## 2025-08-14 RX ORDER — INFLIXIMAB 100 MG/10ML
AS DIRECTED INJECTION, POWDER, LYOPHILIZED, FOR SOLUTION INTRAVENOUS
Status: ACTIVE | COMMUNITY
Start: 2024-10-09

## 2025-08-14 RX ORDER — INFLIXIMAB 100 MG/10ML
AS DIRECTED INJECTION, POWDER, LYOPHILIZED, FOR SOLUTION INTRAVENOUS
Status: ACTIVE | COMMUNITY
Start: 2023-11-13

## 2025-08-14 RX ORDER — DENOSUMAB 60 MG/ML
AS DIRECTED INJECTION SUBCUTANEOUS
Status: ACTIVE | COMMUNITY

## 2025-08-14 RX ORDER — ONDANSETRON HYDROCHLORIDE 4 MG/1
1 TABLET TABLET, FILM COATED ORAL EVERY 4 HOURS PRN
Qty: 3 | Refills: 0 | Status: ON HOLD | COMMUNITY
Start: 2024-05-16